# Patient Record
Sex: MALE | Race: BLACK OR AFRICAN AMERICAN | Employment: FULL TIME | URBAN - METROPOLITAN AREA
[De-identification: names, ages, dates, MRNs, and addresses within clinical notes are randomized per-mention and may not be internally consistent; named-entity substitution may affect disease eponyms.]

---

## 2017-01-06 ENCOUNTER — OFFICE VISIT (OUTPATIENT)
Dept: INTERNAL MEDICINE CLINIC | Age: 37
End: 2017-01-06

## 2017-01-06 VITALS
TEMPERATURE: 96.9 F | HEIGHT: 65 IN | BODY MASS INDEX: 21.33 KG/M2 | OXYGEN SATURATION: 99 % | RESPIRATION RATE: 16 BRPM | SYSTOLIC BLOOD PRESSURE: 114 MMHG | WEIGHT: 128 LBS | HEART RATE: 84 BPM | DIASTOLIC BLOOD PRESSURE: 86 MMHG

## 2017-01-06 DIAGNOSIS — M54.41 ACUTE MIDLINE LOW BACK PAIN WITH RIGHT-SIDED SCIATICA: Primary | ICD-10-CM

## 2017-01-06 RX ORDER — GABAPENTIN 300 MG/1
300 CAPSULE ORAL
Qty: 20 CAP | Refills: 0 | Status: SHIPPED | OUTPATIENT
Start: 2017-01-06 | End: 2017-09-20 | Stop reason: SDUPTHER

## 2017-01-06 RX ORDER — TRAMADOL HYDROCHLORIDE 50 MG/1
50 TABLET ORAL
Qty: 21 TAB | Refills: 0 | Status: SHIPPED | OUTPATIENT
Start: 2017-01-06 | End: 2017-09-20

## 2017-01-06 NOTE — LETTER
NOTIFICATION RETURN TO WORK / SCHOOL 
 
1/6/2017 10:11 AM 
 
Mr. Mariano Armendariz [address] [address] To Whom It May Concern: 
 
Mariano Armendariz is currently under the care of Analia N Shaunna Sorensen. He will return to work/school on: 1/16/17. This date may change based on his visit with specialists. If there are questions or concerns please have the patient contact our office. Sincerely, Denisse Reilly, NP

## 2017-01-06 NOTE — PATIENT INSTRUCTIONS
1. You have an appt with Dr. Mone Stephenson on Tuesday at 1:50 - arrive at 1:30 for paperwork. It is at the [facility] location  [address]  [address]  [facility]  2. Gabapentin may help with nerve pain but it can make you tired - do not take with flexeril  3. Tramadol for pain - do not take with norco  4. GO TO ED if you have dizziness, confusion, headaches return, or if you have worsening back symptoms: urine problems, worsening leg weakness, or if you can't feel his buttocks      Back Pain, Emergency or Urgent Symptoms: Care Instructions  Your Care Instructions  Many people have back pain at one time or another. In most cases, pain gets better with self-care that includes over-the-counter pain medicine, ice, heat, and exercises. Unless you have symptoms of a severe injury or heart attack, you may be able to give yourself a few days before you call a doctor. But some back problems are very serious. Do not ignore symptoms that need to be checked right away. Follow-up care is a key part of your treatment and safety. Be sure to make and go to all appointments, and call your doctor if you are having problems. It's also a good idea to know your test results and keep a list of the medicines you take. How can you care for yourself at home? · Sit or lie in positions that are most comfortable and that reduce your pain. Try one of these positions when you lie down:  ¨ Lie on your back with your knees bent and supported by large pillows. ¨ Lie on the floor with your legs on the seat of a sofa or chair. Raul Ports on your side with your knees and hips bent and a pillow between your legs. ¨ Lie on your stomach if it does not make pain worse. · Do not sit up in bed, and avoid soft couches and twisted positions. Bed rest can help relieve pain at first, but it delays healing. Avoid bed rest after the first day. · Change positions every 30 minutes.  If you must sit for long periods of time, take breaks from sitting. Get up and walk around, or lie flat. · Try using a heating pad on a low or medium setting, for 15 to 20 minutes every 2 or 3 hours. Try a warm shower in place of one session with the heating pad. You can also buy single-use heat wraps that last up to 8 hours. You can also try ice or cold packs on your back for 10 to 20 minutes at a time, several times a day. (Put a thin cloth between the ice pack and your skin.) This reduces pain and makes it easier to be active and exercise. · Take pain medicines exactly as directed. ¨ If the doctor gave you a prescription medicine for pain, take it as prescribed. ¨ If you are not taking a prescription pain medicine, ask your doctor if you can take an over-the-counter medicine. When should you call for help? Call 911 anytime you think you may need emergency care. For example, call if:  · You are unable to move a leg at all. · You have back pain with severe belly pain. · You have symptoms of a heart attack. These may include:  ¨ Chest pain or pressure, or a strange feeling in the chest.  ¨ Sweating. ¨ Shortness of breath. ¨ Nausea or vomiting. ¨ Pain, pressure, or a strange feeling in the back, neck, jaw, or upper belly or in one or both shoulders or arms. ¨ Lightheadedness or sudden weakness. ¨ A fast or irregular heartbeat. After you call 911, the  may tell you to chew 1 adult-strength or 2 to 4 low-dose aspirin. Wait for an ambulance. Do not try to drive yourself. Call your doctor now or seek immediate medical care if:  · You have new or worse symptoms in your arms, legs, chest, belly, or buttocks. Symptoms may include:  ¨ Numbness or tingling. ¨ Weakness. ¨ Pain. · You lose bladder or bowel control. · You have back pain and:  ¨ You have injured your back while lifting or doing some other activity. Call if the pain is severe, has not gone away after 1 or 2 days, and you cannot do your normal daily activities.   ¨ You have had a back injury before that needed treatment. ¨ Your pain has lasted longer than 4 weeks. ¨ You have had weight loss you cannot explain. ¨ You are age 48 or older. ¨ You have cancer now or have had it before. Watch closely for changes in your health, and be sure to contact your doctor if you are not getting better as expected. Where can you learn more? Go to http://evaristo-sylvester.info/. Enter P180 in the search box to learn more about \"Back Pain, Emergency or Urgent Symptoms: Care Instructions. \"  Current as of: May 27, 2016  Content Version: 11.1  © 6464-8507 [employer]!. Care instructions adapted under license by [employer] (which disclaims liability or warranty for this information). If you have questions about a medical condition or this instruction, always ask your healthcare professional. [address] any warranty or liability for your use of this information.

## 2017-01-06 NOTE — MR AVS SNAPSHOT
Visit Information Date & Time Provider Department Dept. Phone Encounter #  
 1/6/2017  9:45 AM Denisse Mcarthur, [address]  [id number] Follow-up Instructions Return if symptoms worsen or fail to improve. Upcoming Health Maintenance Date Due DTaP/Tdap/Td series (1 - Tdap) 3/30/2002 Pneumococcal 19-64 Medium Risk (1 of 1 - PPSV23) 2/6/2017* INFLUENZA AGE 9 TO ADULT 2/6/2017* *Topic was postponed. The date shown is not the original due date. Allergies as of 1/6/2017  Review Complete On: 1/6/2017 By: Oletha Ganser Severity Noted Reaction Type Reactions Aspirin  11/29/2012    Hives Has tolerated ibuprofen in the past  
 Aspirnil  12/04/2014    Hives Has tolerated ibuprofen in the past  
 Iodine  11/29/2012    Hives Shellfish Derived  12/04/2014    Hives Current Immunizations  Never Reviewed No immunizations on file. Not reviewed this visit You Were Diagnosed With   
  
 Codes Comments Acute midline low back pain with right-sided sciatica    -  Primary ICD-10-CM: M54.41 
ICD-9-CM: 724.2, 724.3 Vitals BP Pulse Temp Resp Height(growth percentile) Weight(growth percentile) 114/86 (BP 1 Location: Left arm, BP Patient Position: Sitting) 84 96.9 °F (36.1 °C) (Oral) 16 5' 4.5\" (1.638 m) 128 lb (58.1 kg) SpO2 BMI Smoking Status 99% 21.63 kg/m2 Current Every Day Smoker Vitals History BMI and BSA Data Body Mass Index Body Surface Area  
 21.63 kg/m 2 1.63 m 2 Preferred Pharmacy Pharmacy Name Phone [id number] [address] [phone] Your Updated Medication List  
  
   
This list is accurate as of: 1/6/17 10:08 AM.  Always use your most recent med list.  
  
  
  
  
 albuterol 90 mcg/actuation inhaler Commonly known as:  Jose William  
 Take 2 Puffs by inhalation every six (6) hours as needed. gabapentin 300 mg capsule Commonly known as:  NEURONTIN Take 1 Cap by mouth nightly as needed. methylPREDNISolone 4 mg tablet Commonly known as:  MEDROL (CARLO) Please take according to directions. traMADol 50 mg tablet Commonly known as:  ULTRAM  
Take 1 Tab by mouth every eight (8) hours as needed for Pain. Max Daily Amount: 150 mg.  
  
  
  
  
Prescriptions Printed Refills  
 traMADol (ULTRAM) 50 mg tablet 0 Sig: Take 1 Tab by mouth every eight (8) hours as needed for Pain. Max Daily Amount: 150 mg.  
 Class: Print Route: Oral  
  
Prescriptions Sent to Pharmacy Refills  
 gabapentin (NEURONTIN) 300 mg capsule 0 Sig: Take 1 Cap by mouth nightly as needed. Class: Normal  
 Pharmacy: [facility]/pharmacy [id number] [place] [address] [address]  #: [phone] Route: Oral  
  
Follow-up Instructions Return if symptoms worsen or fail to improve. Patient Instructions 1. You have an appt with Dr. Matt Garcia on Tuesday at 1:50 - arrive at 1:30 for paperwork. It is at the [address] [place] [address] location [address] [facility] [address] [place], [address] 
2. Gabapentin may help with nerve pain but it can make you tired - do not take with flexeril 3. Tramadol for pain - do not take with norco 
4. GO TO ED if you have dizziness, confusion, headaches return, or if you have worsening back symptoms: urine problems, worsening leg weakness, or if you can't feel his buttocks Back Pain, Emergency or Urgent Symptoms: Care Instructions Your Care Instructions Many people have back pain at one time or another. In most cases, pain gets better with self-care that includes over-the-counter pain medicine, ice, heat, and exercises. Unless you have symptoms of a severe injury or heart attack, you may be able to give yourself a few days before you call a doctor.  But some back problems are very serious. Do not ignore symptoms that need to be checked right away. Follow-up care is a key part of your treatment and safety. Be sure to make and go to all appointments, and call your doctor if you are having problems. It's also a good idea to know your test results and keep a list of the medicines you take. How can you care for yourself at home? · Sit or lie in positions that are most comfortable and that reduce your pain. Try one of these positions when you lie down: ¨ Lie on your back with your knees bent and supported by large pillows. ¨ Lie on the floor with your legs on the seat of a sofa or chair. Kay Sauce on your side with your knees and hips bent and a pillow between your legs. ¨ Lie on your stomach if it does not make pain worse. · Do not sit up in bed, and avoid soft couches and twisted positions. Bed rest can help relieve pain at first, but it delays healing. Avoid bed rest after the first day. · Change positions every 30 minutes. If you must sit for long periods of time, take breaks from sitting. Get up and walk around, or lie flat. · Try using a heating pad on a low or medium setting, for 15 to 20 minutes every 2 or 3 hours. Try a warm shower in place of one session with the heating pad. You can also buy single-use heat wraps that last up to 8 hours. You can also try ice or cold packs on your back for 10 to 20 minutes at a time, several times a day. (Put a thin cloth between the ice pack and your skin.) This reduces pain and makes it easier to be active and exercise. · Take pain medicines exactly as directed. ¨ If the doctor gave you a prescription medicine for pain, take it as prescribed. ¨ If you are not taking a prescription pain medicine, ask your doctor if you can take an over-the-counter medicine. When should you call for help? Call 911 anytime you think you may need emergency care. For example, call if: 
· You are unable to move a leg at all. · You have back pain with severe belly pain. · You have symptoms of a heart attack. These may include: ¨ Chest pain or pressure, or a strange feeling in the chest. 
¨ Sweating. ¨ Shortness of breath. ¨ Nausea or vomiting. ¨ Pain, pressure, or a strange feeling in the back, neck, jaw, or upper belly or in one or both shoulders or arms. ¨ Lightheadedness or sudden weakness. ¨ A fast or irregular heartbeat. After you call 911, the  may tell you to chew 1 adult-strength or 2 to 4 low-dose aspirin. Wait for an ambulance. Do not try to drive yourself. Call your doctor now or seek immediate medical care if: 
· You have new or worse symptoms in your arms, legs, chest, belly, or buttocks. Symptoms may include: ¨ Numbness or tingling. ¨ Weakness. ¨ Pain. · You lose bladder or bowel control. · You have back pain and: 
¨ You have injured your back while lifting or doing some other activity. Call if the pain is severe, has not gone away after 1 or 2 days, and you cannot do your normal daily activities. ¨ You have had a back injury before that needed treatment. ¨ Your pain has lasted longer than 4 weeks. ¨ You have had weight loss you cannot explain. ¨ You are age 48 or older. ¨ You have cancer now or have had it before. Watch closely for changes in your health, and be sure to contact your doctor if you are not getting better as expected. Where can you learn more? Go to http://evaristo-sylvester.info/. Enter Y169 in the search box to learn more about \"Back Pain, Emergency or Urgent Symptoms: Care Instructions. \" Current as of: May 27, 2016 Content Version: 11.1 © 6190-8369 Dotspin. Care instructions adapted under license by [employer] (which disclaims liability or warranty for this information).  If you have questions about a medical condition or this instruction, always ask your healthcare professional. [employer] disclaims any warranty or liability for your use of this information. Introducing [facility]! Lita Roca introduces [employer] patient portal. Now you can access parts of your medical record, email your doctor's office, and request medication refills online. 1. In your internet browser, go to [email or web address]. [email or web address] 2. Click on the First Time User? Click Here link in the Sign In box. You will see the New Member Sign Up page. 3. Enter your [employer] Access Code exactly as it appears below. You will not need to use this code after youve completed the sign-up process. If you do not sign up before the expiration date, you must request a new code. · [employer] Access Code: [email or web address] Expires: 1/29/2017  3:18 AM 
 
4. Enter the last four digits of your Social Security Number (xxxx) and Date of Birth (mm/dd/yyyy) as indicated and click Submit. You will be taken to the next sign-up page. 5. Create a [employer] ID. This will be your [employer] login ID and cannot be changed, so think of one that is secure and easy to remember. 6. Create a [employer] password. You can change your password at any time. 7. Enter your Password Reset Question and Answer. This can be used at a later time if you forget your password. 8. Enter your e-mail address. You will receive e-mail notification when new information is available in [address] 9. Click Sign Up. You can now view and download portions of your medical record. 10. Click the Download Summary menu link to download a portable copy of your medical information. If you have questions, please visit the Frequently Asked Questions section of the [employer] website. Remember, [employer] is NOT to be used for urgent needs. For medical emergencies, dial 911. Now available from your iPhone and Android! Please provide this summary of care documentation to your next provider. Your primary care clinician is listed as Phys Other. If you have any questions after today's visit, please call [phone].

## 2017-01-06 NOTE — PROGRESS NOTES
Chief Complaint   Patient presents with    Back Pain     Is having the same issues as last time but has now gotten worse    Fall     lost feeling in leg and also has a lump on head       Last flexeril was last night. Appt with ortho on the 18 th.   Has not made appt for PT yet

## 2017-01-06 NOTE — PROGRESS NOTES
Subjective: (As above and below)     Chief Complaint   Patient presents with    Back Pain     Is having the same issues as last time but has now gotten worse    Fall     lost feeling in leg and also has a lump on head    Hip Pain     right sided    Foot Pain     right feeling numbness     Genet Taylor is a 29y.o. year old male who presents for continued low back pain with right sided sciatica. He was eval'd in ED and then in this office last week. He was advised to take steroid pack as prescribed by ED and follow up with PT and ortho. He states that he completed steroids and has an appt with ortho mid January. Since last visit, he states his symptoms are worsening. He reports worsening low back pain with worsening numbness/tingling down to right foot. He says that yesterday he stood up quickly and fell due to leg weakness - he fell backwards and hit the back left side of head. After fall, he had a headache but NO loc, dizziness. He says his headache is resolved and he is still not experiencing any loc, dizziness. He denies any saddle numbness. He denies any bowel/bladder symptoms (incontinence or retention). Reviewed PmHx, RxHx, FmHx, SocHx, AllgHx and updated in chart.   Family History   Problem Relation Age of Onset    Hypertension Father     Diabetes Maternal Grandmother     Cancer Paternal Grandmother        Past Medical History   Diagnosis Date    Asthma     Low back pain       Social History     Social History    Marital status:      Spouse name: N/A    Number of children: N/A    Years of education: N/A     Social History Main Topics    Smoking status: Current Every Day Smoker     Packs/day: 0.50    Smokeless tobacco: None    Alcohol use Yes      Comment: rarely    Drug use: No    Sexual activity: Yes     Partners: Female     Other Topics Concern    None     Social History Narrative    ** Merged History Encounter **               Current Outpatient Prescriptions   Medication Sig    gabapentin (NEURONTIN) 300 mg capsule Take 1 Cap by mouth nightly as needed.  traMADol (ULTRAM) 50 mg tablet Take 1 Tab by mouth every eight (8) hours as needed for Pain. Max Daily Amount: 150 mg.    albuterol (PROVENTIL, VENTOLIN) 90 mcg/actuation inhaler Take 2 Puffs by inhalation every six (6) hours as needed.  methylPREDNISolone (MEDROL, CARLO,) 4 mg tablet Please take according to directions. No current facility-administered medications for this visit. Review of Systems:   Constitutional:    Negative for fever and chills, negative diaphoresis. HEENT:              Negative for neck pain and stiffness. Eyes:                  Negative for visual disturbance, itching, redness or discharge. Respiratory:        Negative for cough and shortness of breath. Cardiovascular:  Negative for chest pain and palpitations. Gastrointestinal: Negative for nausea, vomiting, abdominal pain, diarrhea or constipation. Genitourinary:     Negative for dysuria and frequency. Musculoskeletal: + for falls, tenderness. Skin:                    Negative for rash, masses or lesions. Neurological:       Negative for dizzyness, seizure, loss of consciousness, weakness and numbness. Objective:     Vitals:    01/06/17 0941   BP: 114/86   Pulse: 84   Resp: 16   Temp: 96.9 °F (36.1 °C)   TempSrc: Oral   SpO2: 99%   Weight: 128 lb (58.1 kg)   Height: 5' 4.5\" (1.638 m)         Physical Examination: General appearance - alert, well appearing, and in no distress  Mental status - alert, oriented to person, place, and time  Eyes - pupils equal and reactive, extraocular eye movements intact  Chest - clear to auscultation, no wheezes, rales or rhonchi, symmetric air entry  Heart - normal rate, regular rhythm, normal S1, S2, no murmurs, rubs, clicks or gallops  Neurological - alert, oriented, normal speech. Right leg strength 4/5 compared to left 5/5.  Gait is slow and favors left side d/t right sided weakness/pain  Musculoskeletal - TTP to low back - right side paraspinals. +SLR right leg. Pt is favoring leaning to right side, +levoscoliosis    Assessment/ Plan:   Follow-up Disposition:  Return if symptoms worsen or fail to improve. 1. Acute midline low back pain with right-sided sciatica  Advised stop flexeril, pt is out of [place]  Ortho appt r/s to Tuesday  Advised ED over the weekend if loc, dizziness, headaches or saddle numbness, bowel/bladder problems or worsening leg weakness  Pt is not driving, his friend brought him to appt and will provide assistance to him  - gabapentin (NEURONTIN) 300 mg capsule; Take 1 Cap by mouth nightly as needed. Dispense: 20 Cap; Refill: 0  - traMADol (ULTRAM) 50 mg tablet; Take 1 Tab by mouth every eight (8) hours as needed for Pain. Max Daily Amount: 150 mg. Dispense: 21 Tab; Refill: 0        I have discussed the diagnosis with the patient and the intended plan as seen in the above orders. The patient has received an after-visit summary and questions were answered concerning future plans. Pt conveyed understanding of plan. Medication Side Effects and Warnings were discussed with patient: yes  Patient Labs were reviewed: yes  Patient Past Records were reviewed:  yes    Felisha Mendosa.  Terry Fernando NP

## 2017-01-11 ENCOUNTER — TELEPHONE (OUTPATIENT)
Dept: INTERNAL MEDICINE CLINIC | Age: 37
End: 2017-01-11

## 2017-01-11 NOTE — TELEPHONE ENCOUNTER
Called to check on patient  He went to ortho - he returns on Thursday for follow up testing to decide surgery vs further back injections  Pt was informed that paperwork will be completed today for him to

## 2017-01-17 ENCOUNTER — TELEPHONE (OUTPATIENT)
Dept: INTERNAL MEDICINE CLINIC | Age: 37
End: 2017-01-17

## 2017-01-26 ENCOUNTER — TELEPHONE (OUTPATIENT)
Dept: INTERNAL MEDICINE CLINIC | Age: 37
End: 2017-01-26

## 2017-01-31 ENCOUNTER — DOCUMENTATION ONLY (OUTPATIENT)
Dept: INTERNAL MEDICINE CLINIC | Age: 37
End: 2017-01-31

## 2017-01-31 ENCOUNTER — TELEPHONE (OUTPATIENT)
Dept: INTERNAL MEDICINE CLINIC | Age: 37
End: 2017-01-31

## 2017-01-31 NOTE — PROGRESS NOTES
rc'd another leave form from his work. Pt has been out of work for approx 1 month. He has been followed by ortho. Unable to reach pt by phone, left msg with ortho Dr. Zack Chase staff to see what the plan is for him - he was initially cleared to be out of work until 2/1/17.

## 2017-01-31 NOTE — PROGRESS NOTES
Received call back from Dr. Aliya Smith nurse  Pt no showed ortho appt with them today  They also have leave of absence paperwork and were waiting to fill this out based on todays visit. ...   Will not fill out forms in light of this - [facility] follow up with ortho as he was supposed to

## 2017-09-20 ENCOUNTER — HOSPITAL ENCOUNTER (OUTPATIENT)
Dept: LAB | Age: 37
Discharge: HOME | End: 2017-09-20

## 2017-09-20 ENCOUNTER — OFFICE VISIT (OUTPATIENT)
Dept: INTERNAL MEDICINE CLINIC | Age: 37
End: 2017-09-20

## 2017-09-20 VITALS
TEMPERATURE: 97 F | HEART RATE: 67 BPM | BODY MASS INDEX: 21.53 KG/M2 | DIASTOLIC BLOOD PRESSURE: 88 MMHG | HEIGHT: 64 IN | OXYGEN SATURATION: 98 % | WEIGHT: 126.1 LBS | SYSTOLIC BLOOD PRESSURE: 127 MMHG | RESPIRATION RATE: 18 BRPM

## 2017-09-20 DIAGNOSIS — M54.41 CHRONIC BILATERAL LOW BACK PAIN WITH RIGHT-SIDED SCIATICA: ICD-10-CM

## 2017-09-20 DIAGNOSIS — G89.29 CHRONIC BILATERAL LOW BACK PAIN WITH RIGHT-SIDED SCIATICA: ICD-10-CM

## 2017-09-20 DIAGNOSIS — G43.009 MIGRAINE WITHOUT AURA AND WITHOUT STATUS MIGRAINOSUS, NOT INTRACTABLE: ICD-10-CM

## 2017-09-20 DIAGNOSIS — Z23 ENCOUNTER FOR IMMUNIZATION: ICD-10-CM

## 2017-09-20 DIAGNOSIS — Z13.220 SCREENING, LIPID: ICD-10-CM

## 2017-09-20 DIAGNOSIS — Z00.00 MEDICARE ANNUAL WELLNESS VISIT, SUBSEQUENT: Primary | ICD-10-CM

## 2017-09-20 PROCEDURE — 99001 SPECIMEN HANDLING PT-LAB: CPT | Performed by: NURSE PRACTITIONER

## 2017-09-20 RX ORDER — GABAPENTIN 300 MG/1
300 CAPSULE ORAL
Qty: 30 CAP | Refills: 1 | Status: SHIPPED | OUTPATIENT
Start: 2017-09-20 | End: 2017-12-29

## 2017-09-20 RX ORDER — IBUPROFEN 800 MG/1
800 TABLET ORAL
Qty: 60 TAB | Refills: 1 | Status: SHIPPED | OUTPATIENT
Start: 2017-09-20 | End: 2017-12-29

## 2017-09-20 RX ORDER — SUMATRIPTAN 100 MG/1
100 TABLET, FILM COATED ORAL
Qty: 20 TAB | Refills: 0 | Status: SHIPPED | OUTPATIENT
Start: 2017-09-20 | End: 2018-10-11 | Stop reason: SDUPTHER

## 2017-09-20 NOTE — PATIENT INSTRUCTIONS
Ashley Ibarra:  [address], [address]  [address] Physician Office  [address] Physical Therapy  Phone: [phone]      1. Try imitrex for headaches at onset of headache take 1 tab. May repeat in 2 hours if needed but no more than 2 pills in 1 day  2. If headaches worsen: \"worse headache of life\", confusion, dizziness = go to ED  3. Return if headaches not improving   4. Return to ortho for your back       Migraine Headache: Care Instructions  Your Care Instructions  Migraines are painful, throbbing headaches that often start on one side of the head. They may cause nausea and vomiting and make you sensitive to light, sound, or smell. Without treatment, migraines can last from 4 hours to a few days. Medicines can help prevent migraines or stop them after they have started. Your doctor can help you find which ones work best for you. Follow-up care is a key part of your treatment and safety. Be sure to make and go to all appointments, and call your doctor if you are having problems. It's also a good idea to know your test results and keep a list of the medicines you take. How can you care for yourself at home? · Do not drive if you have taken a prescription pain medicine. · Rest in a quiet, dark room until your headache is gone. Close your eyes, and try to relax or go to sleep. Don't watch TV or read. · Put a cold, moist cloth or cold pack on the painful area for 10 to 20 minutes at a time. Put a thin cloth between the cold pack and your skin. · Use a warm, moist towel or a heating pad set on low to relax tight shoulder and neck muscles. · Have someone gently massage your neck and shoulders. · Take your medicines exactly as prescribed. Call your doctor if you think you are having a problem with your medicine. You will get more details on the specific medicines your doctor prescribes.   · Be careful not to take pain medicine more often than the instructions allow. You could get worse or more frequent headaches when the medicine wears off. To prevent migraines  · Keep a headache diary so you can figure out what triggers your headaches. Avoiding triggers may help you prevent headaches. Record when each headache began, how long it lasted, and what the pain was like. (Was it throbbing, aching, stabbing, or dull?) Write down any other symptoms you had with the headache, such as nausea, flashing lights or dark spots, or sensitivity to bright light or loud noise. Note if the headache occurred near your period. List anything that might have triggered the headache. Triggers may include certain foods (chocolate, cheese, wine) or odors, smoke, bright light, stress, or lack of sleep. · If your doctor has prescribed medicine for your migraines, take it as directed. You may have medicine that you take only when you get a migraine and medicine that you take all the time to help prevent migraines. ¨ If your doctor has prescribed medicine for when you get a headache, take it at the first sign of a migraine, unless your doctor has given you other instructions. ¨ If your doctor has prescribed medicine to prevent migraines, take it exactly as prescribed. Call your doctor if you think you are having a problem with your medicine. · Find healthy ways to deal with stress. Migraines are most common during or right after stressful times. Take time to relax before and after you do something that has caused a migraine in the past.  · Try to keep your muscles relaxed by keeping good posture. Check your jaw, face, neck, and shoulder muscles for tension. Try to relax them. When you sit at a desk, change positions often. And make sure to stretch for 30 seconds each hour. · Get plenty of sleep and exercise. · Eat meals on a regular schedule. Avoid foods and drinks that often trigger migraines.  These include chocolate, alcohol (especially red wine and port), aspartame, monosodium glutamate (MSG), and some additives found in foods (such as hot dogs, álvarez, cold cuts, aged cheeses, and pickled foods). · Limit caffeine. Don't drink too much coffee, tea, or soda. But don't quit caffeine suddenly. That can also give you migraines. · Do not smoke or allow others to smoke around you. If you need help quitting, talk to your doctor about stop-smoking programs and medicines. These can increase your chances of quitting for good. · If you are taking birth control pills or hormone therapy, talk to your doctor about whether they are triggering your migraines. When should you call for help? Call 911 anytime you think you may need emergency care. For example, call if:  · You have signs of a stroke. These may include:  ¨ Sudden numbness, paralysis, or weakness in your face, arm, or leg, especially on only one side of your body. ¨ Sudden vision changes. ¨ Sudden trouble speaking. ¨ Sudden confusion or trouble understanding simple statements. ¨ Sudden problems with walking or balance. ¨ A sudden, severe headache that is different from past headaches. Call your doctor now or seek immediate medical care if:  · You have new or worse nausea and vomiting. · You have a new or higher fever. · Your headache gets much worse. Watch closely for changes in your health, and be sure to contact your doctor if:  · You are not getting better after 2 days (48 hours). Where can you learn more? Go to http://evaristo-sylvester.info/. Enter M180 in the search box to learn more about \"Migraine Headache: Care Instructions. \"  Current as of: October 14, 2016  Content Version: 11.3  © 6288-1338 [employer]. Care instructions adapted under license by [employer] (which disclaims liability or warranty for this information).  If you have questions about a medical condition or this instruction, always ask your healthcare professional. [employer] disclaims any warranty or liability for your use of this information. Influenza (Flu) Vaccine: Care Instructions  Your Care Instructions  Influenza (flu) is an infection in the lungs and breathing passages. It is caused by the influenza virus. There are different strains, or types, of the flu virus every year. The flu comes on quickly. It can cause a cough, stuffy nose, fever, chills, tiredness, and aches and pains. These symptoms may last up to 10 days. The flu can make you feel very sick, but most of the time it doesn't lead to other problems. But it can cause serious problems in people who are older or who have a long-term illness, such as heart disease or diabetes. You can help prevent the flu by getting a flu vaccine every year, as soon as it is available. You cannot get the flu from the vaccine. The vaccine prevents most cases of the flu. But even when the vaccine doesn't prevent the flu, it can make symptoms less severe and reduce the chance of problems from the flu. Sometimes, young children and people who have an immune system problem may have a slight fever or muscle aches or pains 6 to 12 hours after getting the shot. These symptoms usually last 1 or 2 days. Follow-up care is a key part of your treatment and safety. Be sure to make and go to all appointments, and call your doctor if you are having problems. It's also a good idea to know your test results and keep a list of the medicines you take. Who should get the flu vaccine? Everyone age 7 months or older should get a flu vaccine each year. It lowers the chance of getting and spreading the flu. The vaccine is very important for people who are at high risk for getting other health problems from the flu. This includes:  · Anyone 48years of age or older. · People who live in a long-term care center, such as a nursing home. · All children 6 months through 25years of age.   · Adults and children 6 months and older who have long-term heart or lung problems, such as asthma. · Adults and children 6 months and older who needed medical care or were in a hospital during the past year because of diabetes, chronic kidney disease, or a weak immune system (including HIV or AIDS). · Women who will be pregnant during the flu season. · People who have any condition that can make it hard to breathe or swallow (such as a brain injury or muscle disorders). · People who can give the flu to others who are at high risk for problems from the flu. This includes all health care workers and close contacts of people age 72 or older. Who should not get the flu vaccine? The person who gives the vaccine may tell you not to get it if you:  · Have a severe allergy to eggs or any part of the vaccine. · Have had a severe reaction to a flu vaccine in the past.  · Have had Guillain-Barré syndrome (GBS). · Are sick with a fever. (Get the vaccine when symptoms are gone.)  How can you care for yourself at home? · If you or your child has a sore arm or a slight fever after the shot, take an over-the-counter pain medicine, such as acetaminophen (Tylenol) or ibuprofen (Advil, Motrin). Read and follow all instructions on the label. Do not give aspirin to anyone younger than 20. It has been linked to Reye syndrome, a serious illness. · Do not take two or more pain medicines at the same time unless the doctor told you to. Many pain medicines have acetaminophen, which is Tylenol. Too much acetaminophen (Tylenol) can be harmful. When should you call for help? Call 911 anytime you think you may need emergency care. For example, call if after getting the flu vaccine:  · You have symptoms of a severe reaction to the flu vaccine. Symptoms of a severe reaction may include:  ¨ Severe difficulty breathing. ¨ Sudden raised, red areas (hives) all over your body. ¨ Severe lightheadedness.   Call your doctor now or seek immediate medical care if after getting the flu vaccine:  · You think you are having a reaction to the flu vaccine, such as a new fever. Watch closely for changes in your health, and be sure to contact your doctor if you have any problems. Where can you learn more? Go to http://evaristo-sylvester.info/. Enter R419 in the search box to learn more about \"Influenza (Flu) Vaccine: Care Instructions. \"  Current as of: August 1, 2016  Content Version: 11.3  © 6078-7149 [employer]. Care instructions adapted under license by [employer] (which disclaims liability or warranty for this information). If you have questions about a medical condition or this instruction, always ask your healthcare professional. [address] any warranty or liability for your use of this information.

## 2017-09-20 NOTE — PROGRESS NOTES
This is a Subsequent Medicare Annual Wellness Exam (AWV) (Performed 12 months after IPPE or effective date of Medicare Part B enrollment, Once in a lifetime)    I have reviewed the patient's medical history in detail and updated the computerized patient record. History     He is here for AWV but also following concerns:    Headaches:  right side sharp pain. \"for a while\" 2-3 time weekly. Each lasting a few hours. Not associated with n/v/loc. No known triggers. Feels like vision gets \"a little funny\". Takes tylenol, motrin or aleve which does not help. No auras. +photophobia. No phonophobia. Hx of rare ha in the past but getting more frequent. He states he is supposed to wear eye glasses but does not. No ha today    Back pain: since last visit he has seen ortho and has rc'd PT and injections. He states that he has gotten approx 4 shots and they last approx 2 months. He states that he does not feel that they are working well and is asking what the next step is, he asks if he is a surgical candidate or if he can get a second opinion from different ortho. He does not have f/u planned with any ortho at this time. He states his back pain flares up a few times per month, he asks for form for intermittent leave from work. No new back symptoms. Past Medical History:   Diagnosis Date    Asthma     Low back pain     Scoliosis 1989      Past Surgical History:   Procedure Laterality Date    HX OTHER SURGICAL      sx to enlarge esophagus as infant    HX OTHER SURGICAL      hernia repair as infant     Current Outpatient Prescriptions   Medication Sig Dispense Refill    gabapentin (NEURONTIN) 300 mg capsule Take 1 Cap by mouth nightly as needed. 30 Cap 1    SUMAtriptan (IMITREX) 100 mg tablet Take 1 Tab by mouth once as needed for Migraine for up to 1 dose. 20 Tab 0    ibuprofen (MOTRIN) 800 mg tablet Take 1 Tab by mouth every eight (8) hours as needed for Pain.  60 Tab 1    albuterol (PROVENTIL, VENTOLIN) 90 mcg/actuation inhaler Take 2 Puffs by inhalation every six (6) hours as needed. Allergies   Allergen Reactions    Aspirin Hives     Has tolerated ibuprofen in the past    Aspirnil Hives     Has tolerated ibuprofen in the past    Iodine Hives    Shellfish Derived Hives     Family History   Problem Relation Age of Onset    Hypertension Father     Diabetes Maternal Grandmother     Cancer Paternal Grandmother      Social History   Substance Use Topics    Smoking status: Current Every Day Smoker     Packs/day: 0.50    Smokeless tobacco: Never Used      Comment: 10 daily    Alcohol use Yes      Comment: rarely     Patient Active Problem List   Diagnosis Code    Chronic right-sided low back pain with sciatica M54.40, G89.29       Depression Risk Factor Screening:     PHQ over the last two weeks 9/20/2017   Little interest or pleasure in doing things Several days   Feeling down, depressed or hopeless Several days   Total Score PHQ 2 2     Alcohol Risk Factor Screening: You do not drink alcohol or very rarely. Functional Ability and Level of Safety:   Hearing Loss  Hearing is good. Activities of Daily Living  The home contains: no safety equipment  Patient does total self care    Fall RiskNo flowsheet data found. Abuse Screen  Patient is not abused    Cognitive Screening   Evaluation of Cognitive Function:  Has your family/caregiver stated any concerns about your memory: no  Normal    Patient Care Team   Patient Care Team:  Job Carroll MD as PCP - General  Phys Other, MD    Assessment/Plan   Education and counseling provided:  Are appropriate based on today's review and evaluation    Diagnoses and all orders for this visit:    1. Medicare annual wellness visit, subsequent  -     METABOLIC PANEL, COMPREHENSIVE  -     LIPID PANEL    2. Chronic bilateral low back pain with right-sided sciatica  -     gabapentin (NEURONTIN) 300 mg capsule; Take 1 Cap by mouth nightly as needed.     3. Frequent headaches    4. Encounter for immunization  -     INFLUENZA VIRUS VACCINE QUADRIVALENT, PRESERVATIVE FREE SYRINGE (66926)    5. Screening, lipid    Other orders  -     SUMAtriptan (IMITREX) 100 mg tablet; Take 1 Tab by mouth once as needed for Migraine for up to 1 dose. -     ibuprofen (MOTRIN) 800 mg tablet; Take 1 Tab by mouth every eight (8) hours as needed for Pain. Health Maintenance Due   Topic Date Due    MEDICARE YEARLY EXAM  03/30/1999    Pneumococcal 19-64 Medium Risk (1 of 1 - PPSV23) 03/30/2000    DTaP/Tdap/Td series (1 - Tdap) 03/30/2002    INFLUENZA AGE 9 TO ADULT  08/01/2017                   Subjective: (As above and below)     Chief Complaint   Patient presents with    Back Pain     Hx Of Scoliosis    Headache    Form Completion     Joslyn Plasencia is a 40y.o. year old male who presents for     Saw ortho at last visit and had injection - last in February. States he had 4 shot since last visit. Reviewed PmHx, RxHx, FmHx, SocHx, AllgHx and updated in chart. Family History   Problem Relation Age of Onset    Hypertension Father     Diabetes Maternal Grandmother     Cancer Paternal Grandmother        Past Medical History:   Diagnosis Date    Asthma     Low back pain     Scoliosis 1989      Social History     Social History    Marital status:      Spouse name: N/A    Number of children: N/A    Years of education: N/A     Social History Main Topics    Smoking status: Current Every Day Smoker     Packs/day: 0.50    Smokeless tobacco: Never Used      Comment: 10 daily    Alcohol use Yes      Comment: rarely    Drug use: No    Sexual activity: Yes     Partners: Female     Other Topics Concern    None     Social History Narrative    ** Merged History Encounter **               Current Outpatient Prescriptions   Medication Sig    gabapentin (NEURONTIN) 300 mg capsule Take 1 Cap by mouth nightly as needed.     SUMAtriptan (IMITREX) 100 mg tablet Take 1 Tab by mouth once as needed for Migraine for up to 1 dose.  ibuprofen (MOTRIN) 800 mg tablet Take 1 Tab by mouth every eight (8) hours as needed for Pain.  albuterol (PROVENTIL, VENTOLIN) 90 mcg/actuation inhaler Take 2 Puffs by inhalation every six (6) hours as needed. No current facility-administered medications for this visit. Review of Systems:   Constitutional:    Negative for fever and chills, negative diaphoresis. HEENT:              Negative for neck pain and stiffness. Eyes:                  Negative for visual disturbance, itching, redness or discharge. Respiratory:        Negative for cough and shortness of breath. Cardiovascular:  Negative for chest pain and palpitations. Gastrointestinal: Negative for nausea, vomiting, abdominal pain, diarrhea or constipation. Genitourinary:     Negative for dysuria and frequency. Musculoskeletal: Negative for falls, tenderness and swelling. Skin:                    Negative for rash, masses or lesions. Neurological:       Negative for dizzyness, seizure, loss of consciousness, weakness and numbness.      Objective:     Vitals:    09/20/17 0808   BP: 127/88   Pulse: 67   Resp: 18   Temp: 97 °F (36.1 °C)   TempSrc: Oral   SpO2: 98%   Weight: 126 lb 1.6 oz (57.2 kg)   Height: 5' 4\" (1.626 m)       Results for orders placed or performed during the hospital encounter of 10/31/16   URINALYSIS W/ REFLEX CULTURE   Result Value Ref Range    Color YELLOW/STRAW      Appearance CLEAR CLEAR      Specific gravity 1.025 1.003 - 1.030      pH (UA) 8.0 5.0 - 8.0      Protein TRACE (A) NEG mg/dL    Glucose NEGATIVE  NEG mg/dL    Ketone NEGATIVE  NEG mg/dL    Bilirubin NEGATIVE  NEG      Blood NEGATIVE  NEG      Urobilinogen 1.0 0.2 - 1.0 EU/dL    Nitrites NEGATIVE  NEG      Leukocyte Esterase NEGATIVE  NEG      WBC 0-4 0 - 4 /hpf    RBC 0-5 0 - 5 /hpf    Epithelial cells FEW FEW /lpf    Bacteria NEGATIVE  NEG /hpf    UA:UC IF INDICATED CULTURE NOT INDICATED BY UA RESULT CNI      Hyaline cast 0-2 0 - 5 /lpf         Physical Examination: General appearance - alert, well appearing, and in no distress  Mental status - alert, oriented to person, place, and time  Eyes - pupils equal and reactive, extraocular eye movements intact  Ears - bilateral TM's and external ear canals normal  Chest - clear to auscultation, no wheezes, rales or rhonchi, symmetric air entry  Heart - normal rate, regular rhythm, normal S1, S2, no murmurs, rubs, clicks or gallops  Extremities - No lower extremity edema       Assessment/ Plan:   Follow-up Disposition:  Return in about 1 year (around 9/20/2018), or if symptoms worsen or fail to improve. Reviewed red flag ha symptoms  Advised f/u with ortho to see what next step is for back pain      1. Medicare annual wellness visit, subsequent    - METABOLIC PANEL, COMPREHENSIVE  - LIPID PANEL    2. Chronic bilateral low back pain with right-sided sciatica    - gabapentin (NEURONTIN) 300 mg capsule; Take 1 Cap by mouth nightly as needed. Dispense: 30 Cap; Refill: 1  - ibuprofen (MOTRIN) 800 mg tablet; Take 1 Tab by mouth every eight (8) hours as needed for Pain. Dispense: 60 Tab; Refill: 1    3. Encounter for immunization    - INFLUENZA VIRUS VACCINE QUADRIVALENT, PRESERVATIVE FREE SYRINGE ([id number])    4. Screening, lipid    - LIPID PANEL    5. Migraine without aura and without status migrainosus, not intractable  Reviewed dose instructions  - SUMAtriptan (IMITREX) 100 mg tablet; Take 1 Tab by mouth once as needed for Migraine for up to 1 dose. Dispense: 20 Tab; Refill: 0        I have discussed the diagnosis with the patient and the intended plan as seen in the above orders. The patient has received an after-visit summary and questions were answered concerning future plans. Pt conveyed understanding of plan.       Medication Side Effects and Warnings were discussed with patient: yes  Patient Labs were reviewed: yes  Patient Past Records were reviewed:  yes    Abhijeet Jasso.  Mike Ayala, YAJAIRA

## 2017-09-20 NOTE — PROGRESS NOTES
Pt here for   Chief Complaint   Patient presents with    Back Pain     Hx Of Scoliosis    Headache    Form Completion     1. Have you been to the ER, urgent care clinic since your last visit? Hospitalized since your last visit? No    2. Have you seen or consulted any other health care providers outside of the [address] [place] [address] since your last visit? Include any pap smears or colon screening.  No     Pt c/o pain 4 of 10, pt took tylenol today    PHQ over the last two weeks 9/20/2017   Little interest or pleasure in doing things Several days   Feeling down, depressed or hopeless Several days   Total Score PHQ 2 2

## 2017-09-20 NOTE — MR AVS SNAPSHOT
Visit Information Date & Time Provider Department Dept. Phone Encounter #  
 9/20/2017  7:45 AM Denisse Baez, [address] [id number] Follow-up Instructions Return in about 1 year (around 9/20/2018), or if symptoms worsen or fail to improve. Upcoming Health Maintenance Date Due  
 MEDICARE YEARLY EXAM 3/30/1999 Pneumococcal 19-64 Medium Risk (1 of 1 - PPSV23) 3/30/2000 DTaP/Tdap/Td series (1 - Tdap) 3/30/2002 INFLUENZA AGE 9 TO ADULT 8/1/2017 Allergies as of 9/20/2017  Review Complete On: 9/20/2017 By: Meaghan Bull. Rossi Baez, YAJAIRA Severity Noted Reaction Type Reactions Aspirin  11/29/2012    Hives Has tolerated ibuprofen in the past  
 Aspirnil  12/04/2014    Hives Has tolerated ibuprofen in the past  
 Iodine  11/29/2012    Hives Shellfish Derived  12/04/2014    Hives Current Immunizations  Never Reviewed No immunizations on file. Not reviewed this visit You Were Diagnosed With   
  
 Codes Comments Medicare annual wellness visit, subsequent    -  Primary ICD-10-CM: Z00.00 ICD-9-CM: V70.0 Chronic bilateral low back pain with right-sided sciatica     ICD-10-CM: M54.41, G89.29 ICD-9-CM: 724.2, 724.3, 338.29 Frequent headaches     ICD-10-CM: R51 ICD-9-CM: 784.0 Encounter for immunization     ICD-10-CM: M60 ICD-9-CM: V03.89 Screening, lipid     ICD-10-CM: X57.010 ICD-9-CM: V77.91 Vitals BP Pulse Temp Resp Height(growth percentile) Weight(growth percentile) 127/88 (BP 1 Location: Left arm, BP Patient Position: Sitting) 67 97 °F (36.1 °C) (Oral) 18 5' 4\" (1.626 m) 126 lb 1.6 oz (57.2 kg) SpO2 BMI Smoking Status 98% 21.65 kg/m2 Current Every Day Smoker BMI and BSA Data Body Mass Index Body Surface Area  
 21.65 kg/m 2 1.61 m 2 Preferred Pharmacy Pharmacy Name Phone [facility]/PHARMACY [address] [place] [address] - [address] [phone] Your Updated Medication List  
  
   
This list is accurate as of: 9/20/17  8:33 AM.  Always use your most recent med list.  
  
  
  
  
 albuterol 90 mcg/actuation inhaler Commonly known as:  Patricia Leaven Take 2 Puffs by inhalation every six (6) hours as needed. gabapentin 300 mg capsule Commonly known as:  NEURONTIN Take 1 Cap by mouth nightly as needed. ibuprofen 800 mg tablet Commonly known as:  MOTRIN Take 1 Tab by mouth every eight (8) hours as needed for Pain. SUMAtriptan 100 mg tablet Commonly known as:  IMITREX Take 1 Tab by mouth once as needed for Migraine for up to 1 dose. Prescriptions Sent to Pharmacy Refills  
 gabapentin (NEURONTIN) 300 mg capsule 1 Sig: Take 1 Cap by mouth nightly as needed. Class: Normal  
 Pharmacy: [facility]/pharmacy [address] [place] [address] [address] Ph #: [phone] Route: Oral  
 SUMAtriptan (IMITREX) 100 mg tablet 0 Sig: Take 1 Tab by mouth once as needed for Migraine for up to 1 dose. Class: Normal  
 [facility] [address] [place] [address] [address] Ph #: [phone] Route: Oral  
 ibuprofen (MOTRIN) 800 mg tablet 1 Sig: Take 1 Tab by mouth every eight (8) hours as needed for Pain. Class: Normal  
 Pharmacy: [facility]/pharmacy [address] [place] [address] [address] Ph #: [phone] Route: Oral  
  
We Performed the Following LIPID PANEL [01496 CPT(R)] METABOLIC PANEL, COMPREHENSIVE [49124 CPT(R)] Follow-up Instructions Return in about 1 year (around 9/20/2018), or if symptoms worsen or fail to improve. Patient Instructions [facility]: 
[address] [place] [address], [address] Physician Office [address] Physical Therapy Phone: [phone] 1. Try imitrex for headaches at onset of headache take 1 tab. May repeat in 2 hours if needed but no more than 2 pills in 1 day 2. If headaches worsen: \"worse headache of life\", confusion, dizziness = go to ED 3. Return if headaches not improving 4. Return to ortho for your back Migraine Headache: Care Instructions Your Care Instructions Migraines are painful, throbbing headaches that often start on one side of the head. They may cause nausea and vomiting and make you sensitive to light, sound, or smell. Without treatment, migraines can last from 4 hours to a few days. Medicines can help prevent migraines or stop them after they have started. Your doctor can help you find which ones work best for you. Follow-up care is a key part of your treatment and safety. Be sure to make and go to all appointments, and call your doctor if you are having problems. It's also a good idea to know your test results and keep a list of the medicines you take. How can you care for yourself at home? · Do not drive if you have taken a prescription pain medicine. · Rest in a quiet, dark room until your headache is gone. Close your eyes, and try to relax or go to sleep. Don't watch TV or read. · Put a cold, moist cloth or cold pack on the painful area for 10 to 20 minutes at a time. Put a thin cloth between the cold pack and your skin. · Use a warm, moist towel or a heating pad set on low to relax tight shoulder and neck muscles. · Have someone gently massage your neck and shoulders. · Take your medicines exactly as prescribed. Call your doctor if you think you are having a problem with your medicine. You will get more details on the specific medicines your doctor prescribes. · Be careful not to take pain medicine more often than the instructions allow. You could get worse or more frequent headaches when the medicine wears off. To prevent migraines · Keep a headache diary so you can figure out what triggers your headaches. Avoiding triggers may help you prevent headaches. Record when each headache began, how long it lasted, and what the pain was like. (Was it throbbing, aching, stabbing, or dull?) Write down any other symptoms you had with the headache, such as nausea, flashing lights or dark spots, or sensitivity to bright light or loud noise. Note if the headache occurred near your period. List anything that might have triggered the headache. Triggers may include certain foods (chocolate, cheese, wine) or odors, smoke, bright light, stress, or lack of sleep. · If your doctor has prescribed medicine for your migraines, take it as directed. You may have medicine that you take only when you get a migraine and medicine that you take all the time to help prevent migraines. ¨ If your doctor has prescribed medicine for when you get a headache, take it at the first sign of a migraine, unless your doctor has given you other instructions. ¨ If your doctor has prescribed medicine to prevent migraines, take it exactly as prescribed. Call your doctor if you think you are having a problem with your medicine. · Find healthy ways to deal with stress. Migraines are most common during or right after stressful times. Take time to relax before and after you do something that has caused a migraine in the past. 
· Try to keep your muscles relaxed by keeping good posture. Check your jaw, face, neck, and shoulder muscles for tension. Try to relax them. When you sit at a desk, change positions often. And make sure to stretch for 30 seconds each hour. · Get plenty of sleep and exercise. · Eat meals on a regular schedule. Avoid foods and drinks that often trigger migraines.  These include chocolate, alcohol (especially red wine and port), aspartame, monosodium glutamate (MSG), and some additives found in foods (such as hot dogs, álvarez, cold cuts, aged cheeses, and pickled foods). · Limit caffeine. Don't drink too much coffee, tea, or soda. But don't quit caffeine suddenly. That can also give you migraines. · Do not smoke or allow others to smoke around you. If you need help quitting, talk to your doctor about stop-smoking programs and medicines. These can increase your chances of quitting for good. · If you are taking birth control pills or hormone therapy, talk to your doctor about whether they are triggering your migraines. When should you call for help? Call 911 anytime you think you may need emergency care. For example, call if: 
· You have signs of a stroke. These may include: 
¨ Sudden numbness, paralysis, or weakness in your face, arm, or leg, especially on only one side of your body. ¨ Sudden vision changes. ¨ Sudden trouble speaking. ¨ Sudden confusion or trouble understanding simple statements. ¨ Sudden problems with walking or balance. ¨ A sudden, severe headache that is different from past headaches. Call your doctor now or seek immediate medical care if: 
· You have new or worse nausea and vomiting. · You have a new or higher fever. · Your headache gets much worse. Watch closely for changes in your health, and be sure to contact your doctor if: 
· You are not getting better after 2 days (48 hours). Where can you learn more? Go to http://evaristo-sylvester.info/. Enter C632 in the search box to learn more about \"Migraine Headache: Care Instructions. \" Current as of: October 14, 2016 Content Version: 11.3 © 2570-3877 [employer]. Care instructions adapted under license by [employer] (which disclaims liability or warranty for this information). If you have questions about a medical condition or this instruction, always ask your healthcare professional. [address] any warranty or liability for your use of this information. Influenza (Flu) Vaccine: Care Instructions Your Care Instructions Influenza (flu) is an infection in the lungs and breathing passages. It is caused by the influenza virus. There are different strains, or types, of the flu virus every year. The flu comes on quickly. It can cause a cough, stuffy nose, fever, chills, tiredness, and aches and pains. These symptoms may last up to 10 days. The flu can make you feel very sick, but most of the time it doesn't lead to other problems. But it can cause serious problems in people who are older or who have a long-term illness, such as heart disease or diabetes. You can help prevent the flu by getting a flu vaccine every year, as soon as it is available. You cannot get the flu from the vaccine. The vaccine prevents most cases of the flu. But even when the vaccine doesn't prevent the flu, it can make symptoms less severe and reduce the chance of problems from the flu. Sometimes, young children and people who have an immune system problem may have a slight fever or muscle aches or pains 6 to 12 hours after getting the shot. These symptoms usually last 1 or 2 days. Follow-up care is a key part of your treatment and safety. Be sure to make and go to all appointments, and call your doctor if you are having problems. It's also a good idea to know your test results and keep a list of the medicines you take. Who should get the flu vaccine? Everyone age 7 months or older should get a flu vaccine each year. It lowers the chance of getting and spreading the flu. The vaccine is very important for people who are at high risk for getting other health problems from the flu. This includes: · Anyone 48years of age or older. · People who live in a long-term care center, such as a nursing home. · All children 6 months through 25years of age. · Adults and children 6 months and older who have long-term heart or lung problems, such as asthma. · Adults and children 6 months and older who needed medical care or were in a hospital during the past year because of diabetes, chronic kidney disease, or a weak immune system (including HIV or AIDS). · Women who will be pregnant during the flu season. · People who have any condition that can make it hard to breathe or swallow (such as a brain injury or muscle disorders). · People who can give the flu to others who are at high risk for problems from the flu. This includes all health care workers and close contacts of people age 72 or older. Who should not get the flu vaccine? The person who gives the vaccine may tell you not to get it if you: 
· Have a severe allergy to eggs or any part of the vaccine. · Have had a severe reaction to a flu vaccine in the past. 
· Have had Guillain-Barré syndrome (GBS). · Are sick with a fever. (Get the vaccine when symptoms are gone.) How can you care for yourself at home? · If you or your child has a sore arm or a slight fever after the shot, take an over-the-counter pain medicine, such as acetaminophen (Tylenol) or ibuprofen (Advil, Motrin). Read and follow all instructions on the label. Do not give aspirin to anyone younger than 20. It has been linked to Reye syndrome, a serious illness. · Do not take two or more pain medicines at the same time unless the doctor told you to. Many pain medicines have acetaminophen, which is Tylenol. Too much acetaminophen (Tylenol) can be harmful. When should you call for help? Call 911 anytime you think you may need emergency care. For example, call if after getting the flu vaccine: 
· You have symptoms of a severe reaction to the flu vaccine. Symptoms of a severe reaction may include: ¨ Severe difficulty breathing. ¨ Sudden raised, red areas (hives) all over your body. ¨ Severe lightheadedness. Call your doctor now or seek immediate medical care if after getting the flu vaccine: · You think you are having a reaction to the flu vaccine, such as a new fever. Watch closely for changes in your health, and be sure to contact your doctor if you have any problems. Where can you learn more? Go to http://evaristo-sylvester.info/. Enter W473 in the search box to learn more about \"Influenza (Flu) Vaccine: Care Instructions. \" Current as of: August 1, 2016 Content Version: 11.3 © 0157-3880 [employer]. Care instructions adapted under license by [employer] (which disclaims liability or warranty for this information). If you have questions about a medical condition or this instruction, always ask your healthcare professional. [address] any warranty or liability for your use of this information. Introducing [address]! Mark Miller introduces Onefeat patient portal. Now you can access parts of your medical record, email your doctor's office, and request medication refills online. 1. In your internet browser, go to [email or web address] 2. Click on the First Time User? Click Here link in the Sign In box. You will see the New Member Sign Up page. 3. Enter your Onefeat Access Code exactly as it appears below. You will not need to use this code after youve completed the sign-up process. If you do not sign up before the expiration date, you must request a new code. · Onefeat Access Code: [email or web address] Expires: 12/19/2017  8:33 AM 
 
4. Enter the last four digits of your Social Security Number (xxxx) and Date of Birth (mm/dd/yyyy) as indicated and click Submit. You will be taken to the next sign-up page. 5. Create a Healthcare Interactiv[employer] ID. This will be your Onefeat login ID and cannot be changed, so think of one that is secure and easy to remember. 6. Create a Onefeat password. You can change your password at any time. 7. Enter your Password Reset Question and Answer.  This can be used at a later time if you forget your password. 8. Enter your e-mail address. You will receive e-mail notification when new information is available in [address] 9. Click Sign Up. You can now view and download portions of your medical record. 10. Click the Download Summary menu link to download a portable copy of your medical information. If you have questions, please visit the Frequently Asked Questions section of the [employer] website. Remember, [employer] is NOT to be used for urgent needs. For medical emergencies, dial 911. Now available from your iPhone and Android! Please provide this summary of care documentation to your next provider. Your primary care clinician is listed as Phys Other. If you have any questions after today's visit, please call [phone].

## 2017-09-21 PROBLEM — E78.5 HYPERLIPIDEMIA: Status: ACTIVE | Noted: 2017-09-21

## 2017-09-21 LAB
ALBUMIN SERPL-MCNC: 4.7 G/DL (ref 3.5–5.5)
ALBUMIN/GLOB SERPL: 1.7 {RATIO} (ref 1.2–2.2)
ALP SERPL-CCNC: 63 IU/L (ref 39–117)
ALT SERPL-CCNC: 11 IU/L (ref 0–44)
AST SERPL-CCNC: 13 IU/L (ref 0–40)
BILIRUB SERPL-MCNC: 0.3 MG/DL (ref 0–1.2)
BUN SERPL-MCNC: 11 MG/DL (ref 6–20)
BUN/CREAT SERPL: 9 (ref 9–20)
CALCIUM SERPL-MCNC: 9.5 MG/DL (ref 8.7–10.2)
CHLORIDE SERPL-SCNC: 101 MMOL/L (ref 96–106)
CHOLEST SERPL-MCNC: 219 MG/DL (ref 100–199)
CO2 SERPL-SCNC: 25 MMOL/L (ref 18–29)
CREAT SERPL-MCNC: 1.16 MG/DL (ref 0.76–1.27)
GLOBULIN SER CALC-MCNC: 2.8 G/DL (ref 1.5–4.5)
GLUCOSE SERPL-MCNC: 92 MG/DL (ref 65–99)
HDLC SERPL-MCNC: 82 MG/DL
INTERPRETATION, 910389: NORMAL
LDLC SERPL CALC-MCNC: 114 MG/DL (ref 0–99)
POTASSIUM SERPL-SCNC: 4.6 MMOL/L (ref 3.5–5.2)
PROT SERPL-MCNC: 7.5 G/DL (ref 6–8.5)
SODIUM SERPL-SCNC: 139 MMOL/L (ref 134–144)
TRIGL SERPL-MCNC: 114 MG/DL (ref 0–149)
VLDLC SERPL CALC-MCNC: 23 MG/DL (ref 5–40)

## 2017-11-07 DIAGNOSIS — J45.909 ASTHMA, UNSPECIFIED ASTHMA SEVERITY, UNSPECIFIED WHETHER COMPLICATED, UNSPECIFIED WHETHER PERSISTENT: Primary | ICD-10-CM

## 2017-11-07 RX ORDER — ALBUTEROL SULFATE 90 UG/1
1 AEROSOL, METERED RESPIRATORY (INHALATION)
Qty: 1 INHALER | Refills: 1 | Status: SHIPPED | OUTPATIENT
Start: 2017-11-07 | End: 2018-03-15 | Stop reason: SDUPTHER

## 2017-12-29 ENCOUNTER — OFFICE VISIT (OUTPATIENT)
Dept: INTERNAL MEDICINE CLINIC | Age: 37
End: 2017-12-29

## 2017-12-29 VITALS
BODY MASS INDEX: 21.51 KG/M2 | DIASTOLIC BLOOD PRESSURE: 80 MMHG | TEMPERATURE: 98.1 F | SYSTOLIC BLOOD PRESSURE: 130 MMHG | WEIGHT: 126 LBS | OXYGEN SATURATION: 98 % | HEIGHT: 64 IN | RESPIRATION RATE: 18 BRPM | HEART RATE: 77 BPM

## 2017-12-29 DIAGNOSIS — M54.41 CHRONIC RIGHT-SIDED LOW BACK PAIN WITH RIGHT-SIDED SCIATICA: Primary | ICD-10-CM

## 2017-12-29 DIAGNOSIS — G89.29 CHRONIC RIGHT-SIDED LOW BACK PAIN WITH RIGHT-SIDED SCIATICA: Primary | ICD-10-CM

## 2017-12-29 RX ORDER — CYCLOBENZAPRINE HCL 10 MG
10 TABLET ORAL
Qty: 30 TAB | Refills: 0 | Status: SHIPPED | OUTPATIENT
Start: 2017-12-29 | End: 2018-03-15

## 2017-12-29 RX ORDER — NAPROXEN 500 MG/1
500 TABLET ORAL 2 TIMES DAILY WITH MEALS
Qty: 60 TAB | Refills: 1 | Status: SHIPPED | OUTPATIENT
Start: 2017-12-29 | End: 2018-10-11 | Stop reason: SDUPTHER

## 2017-12-29 NOTE — PROGRESS NOTES
1. Have you been to the ER, urgent care clinic since your last visit? Hospitalized since your last visit? No    2. Have you seen or consulted any other health care providers outside of the [address] [place] [address] since your last visit? Include any pap smears or colon screening. Yes When: 08/2017 [facility]     Patient stated he got hurt at work in 2012, and have a hx of scoliosis. Patient have daily pain for the past two weeks. OTC medication not effective.     PHQ over the last two weeks 12/29/2017   Little interest or pleasure in doing things Not at all   Feeling down, depressed or hopeless Not at all   Total Score PHQ 2 0

## 2017-12-29 NOTE — MR AVS SNAPSHOT
Visit Information Date & Time Provider Department Dept. Phone Encounter #  
 12/29/2017  3:30 PM Denisse Calixto, [address] [id number] Follow-up Instructions Return in about 6 months (around 6/29/2018), or if symptoms worsen or fail to improve. Upcoming Health Maintenance Date Due Pneumococcal 19-64 Medium Risk (1 of 1 - PPSV23) 3/30/2000 DTaP/Tdap/Td series (1 - Tdap) 3/30/2002 MEDICARE YEARLY EXAM 9/21/2018 Allergies as of 12/29/2017  Review Complete On: 12/29/2017 By: Erika Thomas LPN Severity Noted Reaction Type Reactions Aspirin  11/29/2012    Hives Has tolerated ibuprofen in the past  
 Aspirnil  12/04/2014    Hives Has tolerated ibuprofen in the past  
 Iodine  11/29/2012    Hives Shellfish Derived  12/04/2014    Hives Current Immunizations  Never Reviewed Name Date Influenza Vaccine (Quad) PF 9/20/2017 Not reviewed this visit You Were Diagnosed With   
  
 Codes Comments Chronic right-sided low back pain with right-sided sciatica    -  Primary ICD-10-CM: M54.41, G89.29 ICD-9-CM: 724.2, 724.3, 338.29 Vitals BP Pulse Temp Resp Height(growth percentile) Weight(growth percentile) 130/80 (BP 1 Location: Right arm, BP Patient Position: Sitting) 77 98.1 °F (36.7 °C) (Oral) 18 5' 4\" (1.626 m) 126 lb (57.2 kg) SpO2 BMI Smoking Status 98% 21.63 kg/m2 Current Every Day Smoker Vitals History BMI and BSA Data Body Mass Index Body Surface Area  
 21.63 kg/m 2 1.61 m 2 Preferred Pharmacy Pharmacy Name Phone [facility]/PHARMACY [id number] [address] [phone] Your Updated Medication List  
  
   
This list is accurate as of: 12/29/17  3:54 PM.  Always use your most recent med list.  
  
  
  
  
 albuterol 90 mcg/actuation inhaler Commonly known as:  Bird Rivera  
 Take 2 Puffs by inhalation every six (6) hours as needed. albuterol 90 mcg/actuation inhaler Commonly known as:  PROVENTIL HFA, VENTOLIN HFA, PROAIR HFA Take 1 Puff by inhalation every six (6) hours as needed for Wheezing. cyclobenzaprine 10 mg tablet Commonly known as:  FLEXERIL Take 1 Tab by mouth two (2) times daily as needed for Muscle Spasm(s). Not at work or while driving  
  
 gabapentin 300 mg capsule Commonly known as:  NEURONTIN Take 1 Cap by mouth nightly as needed. naproxen 500 mg tablet Commonly known as:  NAPROSYN Take 1 Tab by mouth two (2) times daily (with meals). SUMAtriptan 100 mg tablet Commonly known as:  IMITREX Take 1 Tab by mouth once as needed for Migraine for up to 1 dose. Prescriptions Sent to Pharmacy Refills  
 naproxen (NAPROSYN) 500 mg tablet 1 Sig: Take 1 Tab by mouth two (2) times daily (with meals). Class: Normal  
 Pharmacy: [facility]/pharmacy [address] [place] [address] [address] [address] Ph #: [phone] Route: Oral  
 cyclobenzaprine (FLEXERIL) 10 mg tablet 0 Sig: Take 1 Tab by mouth two (2) times daily as needed for Muscle Spasm(s). Not at work or while driving Class: Normal  
 Pharmacy: [facility]/pharmacy [address] [place] [address] [address] [address] Ph #: [phone] Route: Oral  
  
Follow-up Instructions Return in about 6 months (around 6/29/2018), or if symptoms worsen or fail to improve. Patient Instructions High Cholesterol: Care Instructions Your Care Instructions Cholesterol is a type of fat in your blood. It is needed for many body functions, such as making new cells. Cholesterol is made by your body. It also comes from food you eat. High cholesterol means that you have too much of the fat in your blood. This raises your risk of a heart attack and stroke. LDL and HDL are part of your total cholesterol. LDL is the \"bad\" cholesterol. High LDL can raise your risk for heart disease, heart attack, and stroke. HDL is the \"good\" cholesterol. It helps clear bad cholesterol from the body. High HDL is linked with a lower risk of heart disease, heart attack, and stroke. Your cholesterol levels help your doctor find out your risk for having a heart attack or stroke. You and your doctor can talk about whether you need to lower your risk and what treatment is best for you. A heart-healthy lifestyle along with medicines can help lower your cholesterol and your risk. The way you choose to lower your risk will depend on how high your risk is for heart attack and stroke. It will also depend on how you feel about taking medicines. Follow-up care is a key part of your treatment and safety. Be sure to make and go to all appointments, and call your doctor if you are having problems. It's also a good idea to know your test results and keep a list of the medicines you take. How can you care for yourself at home? · Eat a variety of foods every day. Good choices include fruits, vegetables, whole grains (like oatmeal), dried beans and peas, nuts and seeds, soy products (like tofu), and fat-free or low-fat dairy products. · Replace butter, margarine, and hydrogenated or partially hydrogenated oils with olive and canola oils. (Canola oil margarine without trans fat is fine.) · Replace red meat with fish, poultry, and soy protein (like tofu). · Limit processed and packaged foods like chips, crackers, and cookies. · Bake, broil, or steam foods. Don't clark them. · Be physically active. Get at least 30 minutes of exercise on most days of the week. Walking is a good choice. You also may want to do other activities, such as running, swimming, cycling, or playing tennis or team sports.  
· Stay at a healthy weight or lose weight by making the changes in eating and physical activity listed above. Losing just a small amount of weight, even 5 to 10 pounds, can reduce your risk for having a heart attack or stroke. · Do not smoke. When should you call for help? Watch closely for changes in your health, and be sure to contact your doctor if: 
? · You need help making lifestyle changes. ? · You have questions about your medicine. Where can you learn more? Go to http://evaristo-sylvester.info/. Enter F092 in the search box to learn more about \"High Cholesterol: Care Instructions. \" Current as of: September 21, 2016 Content Version: 11.4 © 9406-4787 [employer]. Care instructions adapted under license by [employer] (which disclaims liability or warranty for this information). If you have questions about a medical condition or this instruction, always ask your healthcare professional. [address] any warranty or liability for your use of this information. Introducing [facility]! Dear Holly Aguila: Thank you for requesting a Rebls account. Our records indicate that you already have an active Rebls account. You can access your account anytime at [email or web address] Did you know that you can access your hospital and ER discharge instructions at any time in Rebls? You can also review all of your test results from your hospital stay or ER visit. Additional Information If you have questions, please visit the Frequently Asked Questions section of the Rebls website at [email or web address] Remember, Rebls is NOT to be used for urgent needs. For medical emergencies, dial 911. Now available from your iPhone and Android! Please provide this summary of care documentation to your next provider. Your primary care clinician is listed as Rosa Ward. If you have any questions after today's visit, please call [phone].

## 2017-12-29 NOTE — PATIENT INSTRUCTIONS

## 2017-12-29 NOTE — PROGRESS NOTES
Subjective: (As above and below)     Chief Complaint   Patient presents with   Rosy Jeffrey is a 40y.o. year old male who presents for f/u on back pain and form completion. He has seen ortho, done PT and has had several injections. He took it upon himself to get a second opinion thru [facility]  who said he may be a surgical candidate - he has a f/u with them next month. He is here asking for form renewal for intermittent leave from work. His back pain is unchanged, although not experiencing sciatica symptoms at this time. He states the gabapentin does not help at all. He has low back pain, occ radiating to right leg. Denies saddle numbness, leg weakness, falls or bowel/bladder dysfunction. He says the pain is like a toothache. Reviewed PmHx, RxHx, FmHx, SocHx, AllgHx and updated in chart. Family History   Problem Relation Age of Onset    Hypertension Father     Diabetes Maternal Grandmother     Cancer Paternal Grandmother        Past Medical History:   Diagnosis Date    Asthma     Low back pain     Scoliosis 1989      Social History     Social History    Marital status:      Spouse name: N/A    Number of children: N/A    Years of education: N/A     Social History Main Topics    Smoking status: Current Every Day Smoker     Packs/day: 0.50    Smokeless tobacco: Never Used      Comment: 10 daily    Alcohol use Yes      Comment: rarely    Drug use: No    Sexual activity: Yes     Partners: Female     Other Topics Concern    None     Social History Narrative    Works at [employer]               Current Outpatient Prescriptions   Medication Sig    naproxen (NAPROSYN) 500 mg tablet Take 1 Tab by mouth two (2) times daily (with meals).  cyclobenzaprine (FLEXERIL) 10 mg tablet Take 1 Tab by mouth two (2) times daily as needed for Muscle Spasm(s).  Not at work or while driving    albuterol (PROVENTIL HFA, VENTOLIN HFA, PROAIR HFA) 90 mcg/actuation inhaler Take 1 Puff by inhalation every six (6) hours as needed for Wheezing.  gabapentin (NEURONTIN) 300 mg capsule Take 1 Cap by mouth nightly as needed.  SUMAtriptan (IMITREX) 100 mg tablet Take 1 Tab by mouth once as needed for Migraine for up to 1 dose.  albuterol (PROVENTIL, VENTOLIN) 90 mcg/actuation inhaler Take 2 Puffs by inhalation every six (6) hours as needed. No current facility-administered medications for this visit. Review of Systems:   Constitutional:    Negative for fever and chills, negative diaphoresis. HEENT:              Negative for neck pain and stiffness. Eyes:                  Negative for visual disturbance, itching, redness or discharge. Respiratory:        Negative for cough and shortness of breath. Cardiovascular:  Negative for chest pain and palpitations. Gastrointestinal: Negative for nausea, vomiting, abdominal pain, diarrhea or constipation. Genitourinary:     Negative for dysuria and frequency. Musculoskeletal: Negative for falls, tenderness and swelling. Skin:                    Negative for rash, masses or lesions. Neurological:       Negative for dizzyness, seizure, loss of consciousness, weakness and numbness.      Objective:     Vitals:    12/29/17 1520   BP: 130/80   Pulse: 77   Resp: 18   Temp: 98.1 °F (36.7 °C)   TempSrc: Oral   SpO2: 98%   Weight: 126 lb (57.2 kg)   Height: 5' 4\" (1.626 m)       Results for orders placed or performed in visit on 98/83/01   METABOLIC PANEL, COMPREHENSIVE   Result Value Ref Range    Glucose 92 65 - 99 mg/dL    BUN 11 6 - 20 mg/dL    Creatinine 1.16 0.76 - 1.27 mg/dL    GFR est non-AA 81 >59 mL/min/1.73    GFR est AA 93 >59 mL/min/1.73    BUN/Creatinine ratio 9 9 - 20    Sodium 139 134 - 144 mmol/L    Potassium 4.6 3.5 - 5.2 mmol/L    Chloride 101 96 - 106 mmol/L    CO2 25 18 - 29 mmol/L    Calcium 9.5 8.7 - 10.2 mg/dL    Protein, total 7.5 6.0 - 8.5 g/dL    Albumin 4.7 3.5 - 5.5 g/dL    GLOBULIN, TOTAL 2.8 1.5 - 4.5 g/dL    A-G Ratio 1.7 1.2 - 2.2    Bilirubin, total 0.3 0.0 - 1.2 mg/dL    Alk. phosphatase 63 39 - 117 IU/L    AST (SGOT) 13 0 - 40 IU/L    ALT (SGPT) 11 0 - 44 IU/L   LIPID PANEL   Result Value Ref Range    Cholesterol, total 219 (H) 100 - 199 mg/dL    Triglyceride 114 0 - 149 mg/dL    HDL Cholesterol 82 >39 mg/dL    VLDL, calculated 23 5 - 40 mg/dL    LDL, calculated 114 (H) 0 - 99 mg/dL   CVD REPORT   Result Value Ref Range    INTERPRETATION Note          Physical Examination: General appearance - alert, well appearing, and in no distress  Chest - clear to auscultation, no wheezes, rales or rhonchi, symmetric air entry  Heart - normal rate, regular rhythm, normal S1, S2, no murmurs, rubs, clicks or gallops  Back exam - ttp to right lumbar paraspinal, gait normal    Assessment/ Plan:   Follow-up Disposition:  Return in about 6 months (around 6/29/2018), or if symptoms worsen or fail to improve. 1. Chronic right-sided low back pain with right-sided sciatica  No other nsaids, short course flexeril (do not drive/work on this med), f/u with ortho as planned    - naproxen (NAPROSYN) 500 mg tablet; Take 1 Tab by mouth two (2) times daily (with meals). Dispense: 60 Tab; Refill: 1  - cyclobenzaprine (FLEXERIL) 10 mg tablet; Take 1 Tab by mouth two (2) times daily as needed for Muscle Spasm(s). Not at work or while driving  Dispense: 30 Tab; Refill: 0        I have discussed the diagnosis with the patient and the intended plan as seen in the above orders. The patient has received an after-visit summary and questions were answered concerning future plans. Pt conveyed understanding of plan. Medication Side Effects and Warnings were discussed with patient: yes  Patient Labs were reviewed: yes  Patient Past Records were reviewed:  yes    Andrea Bradford NP

## 2018-03-15 ENCOUNTER — OFFICE VISIT (OUTPATIENT)
Dept: INTERNAL MEDICINE CLINIC | Age: 38
End: 2018-03-15

## 2018-03-15 VITALS
RESPIRATION RATE: 18 BRPM | DIASTOLIC BLOOD PRESSURE: 81 MMHG | BODY MASS INDEX: 21.36 KG/M2 | WEIGHT: 125.1 LBS | TEMPERATURE: 97.5 F | SYSTOLIC BLOOD PRESSURE: 122 MMHG | HEIGHT: 64 IN | HEART RATE: 96 BPM | OXYGEN SATURATION: 100 %

## 2018-03-15 DIAGNOSIS — Z30.09 VASECTOMY EVALUATION: Primary | ICD-10-CM

## 2018-03-15 DIAGNOSIS — J45.909 ASTHMA, UNSPECIFIED ASTHMA SEVERITY, UNSPECIFIED WHETHER COMPLICATED, UNSPECIFIED WHETHER PERSISTENT: ICD-10-CM

## 2018-03-15 RX ORDER — ALBUTEROL SULFATE 90 UG/1
1 AEROSOL, METERED RESPIRATORY (INHALATION)
Qty: 1 INHALER | Refills: 1 | Status: SHIPPED | OUTPATIENT
Start: 2018-03-15 | End: 2018-10-11 | Stop reason: SDUPTHER

## 2018-03-15 RX ORDER — GABAPENTIN 300 MG/1
300 CAPSULE ORAL 3 TIMES DAILY
COMMUNITY
End: 2018-03-15 | Stop reason: SDUPTHER

## 2018-03-15 RX ORDER — GABAPENTIN 300 MG/1
300 CAPSULE ORAL 3 TIMES DAILY
Qty: 270 CAP | Refills: 0 | Status: SHIPPED | OUTPATIENT
Start: 2018-03-15 | End: 2019-05-10 | Stop reason: SDUPTHER

## 2018-03-15 NOTE — MR AVS SNAPSHOT
[address] [facility] [address] [facility] [address] 
[phone] Patient: Monique Terrazas MRN: [id number] QPM:[id number]/46[birth year][id number] Visit Information Date & Time Provider Department Dept. Phone Encounter #  
 3/15/2018 11:20 AM Denisse Lancaster [place], [address] [id number] Follow-up Instructions Return in about 6 months (around 9/15/2018), or if symptoms worsen or fail to improve. Upcoming Health Maintenance Date Due  
 MEDICARE YEARLY EXAM 9/21/2018 DTaP/Tdap/Td series (2 - Td) 3/15/2028 Allergies as of 3/15/2018  Review Complete On: 3/15/2018 By: Milan Lock LPN Severity Noted Reaction Type Reactions Aspirin  11/29/2012    Hives Has tolerated ibuprofen in the past  
 Aspirnil  12/04/2014    Hives Has tolerated ibuprofen in the past  
 Iodine  11/29/2012    Hives Shellfish Derived  12/04/2014    Hives Current Immunizations  Never Reviewed Name Date Influenza Vaccine (Quad) PF 9/20/2017 Not reviewed this visit You Were Diagnosed With   
  
 Codes Comments Vasectomy evaluation    -  Primary ICD-10-CM: Z30.09 
ICD-9-CM: V25.09 Asthma, unspecified asthma severity, unspecified whether complicated, unspecified whether persistent     ICD-10-CM: J45.909 ICD-9-CM: 493.90 Vitals BP Pulse Temp Resp Height(growth percentile) Weight(growth percentile) 122/81 (BP 1 Location: Left arm, BP Patient Position: Sitting) 96 97.5 °F (36.4 °C) (Oral) 18 5' 4\" (1.626 m) 125 lb 1.6 oz (56.7 kg) SpO2 BMI Smoking Status 100% 21.47 kg/m2 Current Every Day Smoker BMI and BSA Data Body Mass Index Body Surface Area  
 21.47 kg/m 2 1.6 m 2 Preferred Pharmacy Pharmacy Name Phone [id number], [address] [phone] Your Updated Medication List  
  
   
 This list is accurate as of 3/15/18 11:42 AM.  Always use your most recent med list.  
  
  
  
  
 albuterol 90 mcg/actuation inhaler Commonly known as:  PROVENTIL HFA, VENTOLIN HFA, PROAIR HFA Take 1 Puff by inhalation every six (6) hours as needed for Wheezing. gabapentin 300 mg capsule Commonly known as:  NEURONTIN Take 1 Cap by mouth three (3) times daily. naproxen 500 mg tablet Commonly known as:  NAPROSYN Take 1 Tab by mouth two (2) times daily (with meals). SUMAtriptan 100 mg tablet Commonly known as:  IMITREX Take 1 Tab by mouth once as needed for Migraine for up to 1 dose. Prescriptions Sent to Pharmacy Refills  
 albuterol (PROVENTIL HFA, VENTOLIN HFA, PROAIR HFA) 90 mcg/actuation inhaler 1 Sig: Take 1 Puff by inhalation every six (6) hours as needed for Wheezing. Class: Normal  
 Pharmacy: [id number] - [place], [address] [facility] Ph #: [phone] Route: Inhalation  
 gabapentin (NEURONTIN) 300 mg capsule 0 Sig: Take 1 Cap by mouth three (3) times daily. Class: Normal  
 Pharmacy: [id number] - [place], [address] [facility] Ph #: [phone] Route: Oral  
  
We Performed the Following REFERRAL TO UROLOGY [LZM463 Custom] Follow-up Instructions Return in about 6 months (around 9/15/2018), or if symptoms worsen or fail to improve. Referral Information Referral ID Referred By Referred To  
  
 [id number] Danna Mendoza [facility] [address]   
   [place], [address] Visits Status Start Date End Date 1 New Request 3/15/18 3/15/19 If your referral has a status of pending review or denied, additional information will be sent to support the outcome of this decision. Patient Instructions Vasectomy: Care Instructions Your Care Instructions A vasectomy is an operation to make a man sterile, or not able to make a woman pregnant. During a vasectomy, a doctor cuts or blocks the tubes, called the vas deferens, that carry sperm from the testicles to the penis. This keeps sperm from reaching a woman's egg to make a baby when ejaculation occurs during sex. A vasectomy is a simple procedure that can be done at your doctor's office or clinic. A vasectomy will not change your ability to have sex or your sex drive. You will still be able to enjoy sex in the same way as before. You will still produce normal amounts of semen when you climax. The only difference is that the semen will not contain sperm. Vasectomy is considered a permanent method of birth control. You should only consider a vasectomy if you have completed your family or are sure that you do not want children. Follow-up care is a key part of your treatment and safety. Be sure to make and go to all appointments, and call your doctor if you are having problems. It's also a good idea to know your test results and keep a list of the medicines you take. What happens during a vasectomy? · Your genital area will be washed with soap that kills germs. The area may be shaved. · You may be given medicine to relax you and make you sleepy. · You will get a shot to numb the area at the scrotum, where your doctor will make one or two small cuts. The scrotum is the skin sac that holds the testicles. · The doctor reaches the tube, which is just under the skin, through the small cut. The doctor seals, ties, or cuts each tube. The small cuts may be stitched closed. If you had a no-scalpel vasectomy, you may not have stitches at all. · The procedure usually takes less than 30 minutes. You can expect some swelling and minor pain for several days. You will be given instructions after the procedure. Why might you choose a vasectomy? · You do not want to have a child in the future. Vasectomy is considered a permanent procedure. · Vasectomy is a very effective form of birth control. (But it is not 100% effective.) · The one-time cost of a vasectomy may be less than the ongoing cost of other methods, such as birth control pills or condoms. · You want to spare your partner from having a tubal sterilization. Sterilization for women involves surgery, has more risks, and is more expensive. Why would you choose not to have a vasectomy? · You want to avoid surgery and the risks that come with surgery. · Vasectomy is permanent. Some men may not want to limit their option to have children in case of possible life changes such as divorce. · Having a vasectomy may slightly increase your risk for prostate cancer. · You may have other reasons. These may include your [employer]regational beliefs or what your partner wants. When should you call for help? Be sure to contact your doctor if: 
? · You need more information about vasectomy. ? · You want to have a vasectomy. Where can you learn more? Go to http://evaristo-sylvester.info/. Enter O568 in the search box to learn more about \"Vasectomy: Care Instructions. \" Current as of: March 14, 2017 Content Version: 11.4 © 6139-0534 [employer]. Care instructions adapted under license by [employer] (which disclaims liability or warranty for this information). If you have questions about a medical condition or this instruction, always ask your healthcare professional. [place] [address] any warranty or liability for your use of this information. Work-Life Balance: Care Instructions Your Care Instructions Do you ever feel like there is not enough time to do all of the things you have to do, and no time at all for the things you enjoy? If so, you are not alone. On average, people in the [place] have worked more and more hours each year since 1970.  But in recent years, fewer people say they want to take on more at work, even if they would get promoted or get paid more money. More and more workers say they want time to spend with their families and to do things that are important to them. Do you ever feel: · That you always have more and more work to do at your job? · That too many people depend on you every day? · That you never have enough time for your family or friends? · That you never have time for hobbies or things you enjoy? · That each second of your day is scheduled? If you answered \"yes\" to any of these questions, take steps at work and at home to get your life into balance. Follow-up care is a key part of your treatment and safety. Be sure to make and go to all appointments, and call your doctor if you are having problems. How can you care for yourself at home? Manage your time · Focus on the important things. Taking on too much can wear you out. Look at how you spend your time, and redirect your focus. Learn to say \"no\" and let go of things that do not matter. · Set one small goal at a time. Use a day planner. Break large projects into smaller ones. · Ask for help. Let your children, your spouse, your coworkers, and other people in your life help you get things done. · Leave your job at the office. If you give up free time to get more work done, you may pay for it with stress. If your job offers a flexible work schedule, use it to fit your own work style. For instance, come in earlier to have a longer lunch break, or make time for a yoga class or workout during your workday. · Unplug. Do not let technology (such as your cell phone or the Internet) erase the line between your time and your employer's time. Lower job stress Job stress causes trouble at work and at home. At work, you may worry about things you have not had time to do at home. At home, you may worry about your job. This cycle upsets your work-life balance. Lowering your job stress can get your life back in balance. Job stress can be caused by: · Pressure and deadlines. · Heavy workloads or long hours. · Not being allowed to make decisions. · Health and safety hazards. · Feeling you may lose your job. · Unclear or changing job duties. · Too much responsibility. · Work that is very tiring or boring. Do any of these things bother you? Consider talking with your boss to change things. There are some things that you may not be able to control. But even a few small changes might help lower your stress. Take advantage of programs at work Businesses make money and are better off in other ways if their employees are healthy and happy. For this reason, many companies have programs to help balance work life and home life. These programs may include: · Flexible schedules and hours. · Time off for family reasons, education, or community service. · Being able to work from home. · Employee assistance programs to provide counseling. · Child-care programs. Check to see if your company has any of these or other programs that could help you. If not, consider talking to your boss about why work-life balance programs make good business sense. Even if your company does not start an official program, you may be able to get flexible hours, time off, or the ability to do some work from home. Know when to quit If you are truly unhappy because of a stressful job, and if the suggestions here have not worked, it may be time to think about changing jobs or changing careers. But before you quit, take time to research your options. Where can you learn more? Go to http://evaristo-sylvester.info/. Enter A308 in the search box to learn more about \"Work-Life Balance: Care Instructions. \" Current as of: July 26, 2016 Content Version: 11.4 © 5823-0793 [employer]ress.  Care instructions adapted under license by [employer] (which disclaims liability or warranty for this information). If you have questions about a medical condition or this instruction, always ask your healthcare professional. [address] any warranty or liability for your use of this information. Introducing [facility]! Dear Jaki Teixeira: Thank you for requesting a [facility] account. Our records indicate that you already have an active [facility] account. You can access your account anytime at [email or web address] Did you know that you can access your hospital and ER discharge instructions at any time in [facility]? You can also review all of your test results from your hospital stay or ER visit. Additional Information If you have questions, please visit the Frequently Asked Questions section of the [facility] website at [email or web address] Remember, [facility] is NOT to be used for urgent needs. For medical emergencies, dial 911. Now available from your iPhone and Android! Please provide this summary of care documentation to your next provider. Your primary care clinician is listed as Nir Spicer. If you have any questions after today's visit, please call [phone].

## 2018-03-15 NOTE — PROGRESS NOTES
Pt here for   Chief Complaint   Patient presents with    Sleep Problem     Pt c/o having very vivid dreams    Vasectomy     want to dicuss it     1. Have you been to the ER, urgent care clinic since your last visit? Hospitalized since your last visit? No    2. Have you seen or consulted any other health care providers outside of the [address] [place] [address] since your last visit? Include any pap smears or colon screening.  No       Pt denies pain at this time      PHQ over the last two weeks 3/15/2018   Little interest or pleasure in doing things Several days   Feeling down, depressed or hopeless Several days   Total Score PHQ 2 2

## 2018-03-15 NOTE — PATIENT INSTRUCTIONS
Vasectomy: Care Instructions  Your Care Instructions    A vasectomy is an operation to make a man sterile, or not able to make a woman pregnant. During a vasectomy, a doctor cuts or blocks the tubes, called the vas deferens, that carry sperm from the testicles to the penis. This keeps sperm from reaching a woman's egg to make a baby when ejaculation occurs during sex. A vasectomy is a simple procedure that can be done at your doctor's office or clinic. A vasectomy will not change your ability to have sex or your sex drive. You will still be able to enjoy sex in the same way as before. You will still produce normal amounts of semen when you climax. The only difference is that the semen will not contain sperm. Vasectomy is considered a permanent method of birth control. You should only consider a vasectomy if you have completed your family or are sure that you do not want children. Follow-up care is a key part of your treatment and safety. Be sure to make and go to all appointments, and call your doctor if you are having problems. It's also a good idea to know your test results and keep a list of the medicines you take. What happens during a vasectomy? · Your genital area will be washed with soap that kills germs. The area may be shaved. · You may be given medicine to relax you and make you sleepy. · You will get a shot to numb the area at the scrotum, where your doctor will make one or two small cuts. The scrotum is the skin sac that holds the testicles. · The doctor reaches the tube, which is just under the skin, through the small cut. The doctor seals, ties, or cuts each tube. The small cuts may be stitched closed. If you had a no-scalpel vasectomy, you may not have stitches at all. · The procedure usually takes less than 30 minutes. You can expect some swelling and minor pain for several days. You will be given instructions after the procedure. Why might you choose a vasectomy?   · You do not want to have a child in the future. Vasectomy is considered a permanent procedure. · Vasectomy is a very effective form of birth control. (But it is not 100% effective.)  · The one-time cost of a vasectomy may be less than the ongoing cost of other methods, such as birth control pills or condoms. · You want to spare your partner from having a tubal sterilization. Sterilization for women involves surgery, has more risks, and is more expensive. Why would you choose not to have a vasectomy? · You want to avoid surgery and the risks that come with surgery. · Vasectomy is permanent. Some men may not want to limit their option to have children in case of possible life changes such as divorce. · Having a vasectomy may slightly increase your risk for prostate cancer. · You may have other reasons. These may include your Judaism beliefs or what your partner wants. When should you call for help? Be sure to contact your doctor if:  ? · You need more information about vasectomy. ? · You want to have a vasectomy. Where can you learn more? Go to http://evaristo-sylvester.info/. Enter M965 in the search box to learn more about \"Vasectomy: Care Instructions. \"  Current as of: March 14, 2017  Content Version: 11.4  © 2869-7331 [employer] Care instructions adapted under license by [employer] (which disclaims liability or warranty for this information). If you have questions about a medical condition or this instruction, always ask your healthcare professional. [place] [address] any warranty or liability for your use of this information. Work-Life Balance: Care Instructions  Your Care Instructions    Do you ever feel like there is not enough time to do all of the things you have to do, and no time at all for the things you enjoy? If so, you are not alone. On average, people in the [place] have worked more and more hours each year since 1970.  But in recent years, fewer people say they want to take on more at work, even if they would get promoted or get paid more money. More and more workers say they want time to spend with their families and to do things that are important to them. Do you ever feel:  · That you always have more and more work to do at your job? · That too many people depend on you every day? · That you never have enough time for your family or friends? · That you never have time for hobbies or things you enjoy? · That each second of your day is scheduled? If you answered \"yes\" to any of these questions, take steps at work and at home to get your life into balance. Follow-up care is a key part of your treatment and safety. Be sure to make and go to all appointments, and call your doctor if you are having problems. How can you care for yourself at home? Manage your time  · Focus on the important things. Taking on too much can wear you out. Look at how you spend your time, and redirect your focus. Learn to say \"no\" and let go of things that do not matter. · Set one small goal at a time. Use a day planner. Break large projects into smaller ones. · Ask for help. Let your children, your spouse, your coworkers, and other people in your life help you get things done. · Leave your job at the office. If you give up free time to get more work done, you may pay for it with stress. If your job offers a flexible work schedule, use it to fit your own work style. For instance, come in earlier to have a longer lunch break, or make time for a yoga class or workout during your workday. · Unplug. Do not let technology (such as your cell phone or the Internet) erase the line between your time and your employer's time. Lower job stress  Job stress causes trouble at work and at home. At work, you may worry about things you have not had time to do at home. At home, you may worry about your job. This cycle upsets your work-life balance.  Lowering your job stress can get your life back in balance. Job stress can be caused by:  · Pressure and deadlines. · Heavy workloads or long hours. · Not being allowed to make decisions. · Health and safety hazards. · Feeling you may lose your job. · Unclear or changing job duties. · Too much responsibility. · Work that is very tiring or boring. Do any of these things bother you? Consider talking with your boss to change things. There are some things that you may not be able to control. But even a few small changes might help lower your stress. Take advantage of programs at work  Businesses make money and are better off in other ways if their employees are healthy and happy. For this reason, many companies have programs to help balance work life and home life. These programs may include:  · Flexible schedules and hours. · Time off for family reasons, education, or community service. · Being able to work from home. · Employee assistance programs to provide counseling. · Child-care programs. Check to see if your company has any of these or other programs that could help you. If not, consider talking to your boss about why work-life balance programs make good business sense. Even if your company does not start an official program, you may be able to get flexible hours, time off, or the ability to do some work from home. Know when to quit  If you are truly unhappy because of a stressful job, and if the suggestions here have not worked, it may be time to think about changing jobs or changing careers. But before you quit, take time to research your options. Where can you learn more? Go to http://evaristo-sylvester.info/. Enter H061 in the search box to learn more about \"Work-Life Balance: Care Instructions. \"  Current as of: July 26, 2016  Content Version: 11.4  © 1029-2858 [employer], [employer].  Care instructions adapted under license by [employer] (which disclaims liability or warranty for this information). If you have questions about a medical condition or this instruction, always ask your healthcare professional. [place] [address] any warranty or liability for your use of this information.

## 2018-03-15 NOTE — PROGRESS NOTES
Subjective: (As above and below)     Chief Complaint   Patient presents with    Sleep Problem     Pt c/o having very vivid dreams    Vasectomy     want to dicuss it     Mary Daugherty is a 40y.o. year old male who presents for following concerns:    Dreams: he reports very vivid dreams x 1.5 weeks, sometimes nightmares but not always. No new meds, no illicit drugs. He has had some increased stress at work. No late meals. No s/s of sleep apnea. No daytime hallucinations, depression, panic attacks      Vasectomy: he asks for referral to discuss vasectomy. He has 3 children - ranging from 11to 12years old. Since last visit, he has f/u with ortho and surgical interventions are being held off at this time - he reduced his work hours from 70+ to 50 and this has helped          Reviewed PmHx, RxHx, FmHx, SocHx, AllgHx and updated in chart. Family History   Problem Relation Age of Onset    Hypertension Father     Diabetes Maternal Grandmother     Cancer Paternal Grandmother        Past Medical History:   Diagnosis Date    Asthma     Low back pain     Scoliosis 1989      Social History     Social History    Marital status:      Spouse name: N/A    Number of children: N/A    Years of education: N/A     Social History Main Topics    Smoking status: Current Every Day Smoker     Packs/day: 0.50    Smokeless tobacco: Never Used      Comment: 10 daily    Alcohol use Yes      Comment: rarely    Drug use: No    Sexual activity: Yes     Partners: Female     Other Topics Concern    None     Social History Narrative    Works at [employer]        3/2018: recently  from his wife. Has 3 children               Current Outpatient Prescriptions   Medication Sig    albuterol (PROVENTIL HFA, VENTOLIN HFA, PROAIR HFA) 90 mcg/actuation inhaler Take 1 Puff by inhalation every six (6) hours as needed for Wheezing.     gabapentin (NEURONTIN) 300 mg capsule Take 1 Cap by mouth three (3) times daily.    naproxen (NAPROSYN) 500 mg tablet Take 1 Tab by mouth two (2) times daily (with meals).  SUMAtriptan (IMITREX) 100 mg tablet Take 1 Tab by mouth once as needed for Migraine for up to 1 dose. No current facility-administered medications for this visit. Review of Systems:   Constitutional:    Negative for fever and chills, negative diaphoresis. HEENT:              Negative for neck pain and stiffness. Eyes:                  Negative for visual disturbance, itching, redness or discharge. Respiratory:        Negative for cough and shortness of breath. Cardiovascular:  Negative for chest pain and palpitations. Gastrointestinal: Negative for nausea, vomiting, abdominal pain, diarrhea or constipation. Genitourinary:     Negative for dysuria and frequency. Musculoskeletal: Negative for falls, tenderness and swelling. Skin:                    Negative for rash, masses or lesions. Neurological:       Negative for dizzyness, seizure, loss of consciousness, weakness and numbness. Objective:     Vitals:    03/15/18 1120   BP: 122/81   Pulse: 96   Resp: 18   Temp: 97.5 °F (36.4 °C)   TempSrc: Oral   SpO2: 100%   Weight: 125 lb 1.6 oz (56.7 kg)   Height: 5' 4\" (1.626 m)       Results for orders placed or performed in visit on 54/84/09   METABOLIC PANEL, COMPREHENSIVE   Result Value Ref Range    Glucose 92 65 - 99 mg/dL    BUN 11 6 - 20 mg/dL    Creatinine 1.16 0.76 - 1.27 mg/dL    GFR est non-AA 81 >59 mL/min/1.73    GFR est AA 93 >59 mL/min/1.73    BUN/Creatinine ratio 9 9 - 20    Sodium 139 134 - 144 mmol/L    Potassium 4.6 3.5 - 5.2 mmol/L    Chloride 101 96 - 106 mmol/L    CO2 25 18 - 29 mmol/L    Calcium 9.5 8.7 - 10.2 mg/dL    Protein, total 7.5 6.0 - 8.5 g/dL    Albumin 4.7 3.5 - 5.5 g/dL    GLOBULIN, TOTAL 2.8 1.5 - 4.5 g/dL    A-G Ratio 1.7 1.2 - 2.2    Bilirubin, total 0.3 0.0 - 1.2 mg/dL    Alk.  phosphatase 63 39 - 117 IU/L    AST (SGOT) 13 0 - 40 IU/L    ALT (SGPT) 11 0 - 44 IU/L   LIPID PANEL   Result Value Ref Range    Cholesterol, total 219 (H) 100 - 199 mg/dL    Triglyceride 114 0 - 149 mg/dL    HDL Cholesterol 82 >39 mg/dL    VLDL, calculated 23 5 - 40 mg/dL    LDL, calculated 114 (H) 0 - 99 mg/dL   CVD REPORT   Result Value Ref Range    INTERPRETATION Note          Physical Examination: General appearance - alert, well appearing, and in no distress  Chest - clear to auscultation, no wheezes, rales or rhonchi, symmetric air entry  Heart - normal rate, regular rhythm, normal S1, S2, no murmurs, rubs, clicks or gallops  Skin -No lower extremity edema      Assessment/ Plan:   Follow-up Disposition:  Return in about 6 months (around 9/15/2018), or if symptoms worsen or fail to improve. Dreams likely r/t recent work stressors. Discussed stress mgmt. rtc INI    1. Vasectomy evaluation    - REFERRAL TO UROLOGY    2. Asthma, unspecified asthma severity, unspecified whether complicated, unspecified whether persistent  Refill, rare use  - albuterol (PROVENTIL HFA, VENTOLIN HFA, PROAIR HFA) 90 mcg/actuation inhaler; Take 1 Puff by inhalation every six (6) hours as needed for Wheezing. Dispense: 1 Inhaler; Refill: 1        I have discussed the diagnosis with the patient and the intended plan as seen in the above orders. The patient has received an after-visit summary and questions were answered concerning future plans. Pt conveyed understanding of plan. Medication Side Effects and Warnings were discussed with patient: yes  Patient Labs were reviewed: yes  Patient Past Records were reviewed:  yes    Nubia Meléndez.  Shmuel Small NP

## 2018-03-17 ENCOUNTER — OFFICE VISIT (OUTPATIENT)
Dept: URGENT CARE | Age: 38
End: 2018-03-17

## 2018-03-17 VITALS
WEIGHT: 125 LBS | TEMPERATURE: 98.8 F | BODY MASS INDEX: 21.34 KG/M2 | OXYGEN SATURATION: 98 % | HEIGHT: 64 IN | RESPIRATION RATE: 18 BRPM | DIASTOLIC BLOOD PRESSURE: 84 MMHG | HEART RATE: 89 BPM | SYSTOLIC BLOOD PRESSURE: 133 MMHG

## 2018-03-17 DIAGNOSIS — R07.9 CHEST PAIN, UNSPECIFIED TYPE: Primary | ICD-10-CM

## 2018-03-17 NOTE — PATIENT INSTRUCTIONS
Chest Pain: Care Instructions  Your Care Instructions    There are many things that can cause chest pain. Some are not serious and will get better on their own in a few days. But some kinds of chest pain need more testing and treatment. Your doctor may have recommended a follow-up visit in the next 8 to 12 hours. If you are not getting better, you may need more tests or treatment. Even though your doctor has released you, you still need to watch for any problems. The doctor carefully checked you, but sometimes problems can develop later. If you have new symptoms or if your symptoms do not get better, get medical care right away. If you have worse or different chest pain or pressure that lasts more than 5 minutes or you passed out (lost consciousness), call 911 or seek other emergency help right away. A medical visit is only one step in your treatment. Even if you feel better, you still need to do what your doctor recommends, such as going to all suggested follow-up appointments and taking medicines exactly as directed. This will help you recover and help prevent future problems. How can you care for yourself at home? · Rest until you feel better. · Take your medicine exactly as prescribed. Call your doctor if you think you are having a problem with your medicine. · Do not drive after taking a prescription pain medicine. When should you call for help? Call 911 if:  ? · You passed out (lost consciousness). ? · You have severe difficulty breathing. ? · You have symptoms of a heart attack. These may include:  ¨ Chest pain or pressure, or a strange feeling in your chest.  ¨ Sweating. ¨ Shortness of breath. ¨ Nausea or vomiting. ¨ Pain, pressure, or a strange feeling in your back, neck, jaw, or upper belly or in one or both shoulders or arms. ¨ Lightheadedness or sudden weakness. ¨ A fast or irregular heartbeat.   After you call 911, the  may tell you to chew 1 adult-strength or 2 to 4 low-dose aspirin. Wait for an ambulance. Do not try to drive yourself. ?Call your doctor today if:  ? · You have any trouble breathing. ? · Your chest pain gets worse. ? · You are dizzy or lightheaded, or you feel like you may faint. ? · You are not getting better as expected. ? · You are having new or different chest pain. Where can you learn more? Go to http://evaristo-sylvester.info/. Enter A120 in the search box to learn more about \"Chest Pain: Care Instructions. \"  Current as of: March 20, 2017  Content Version: 11.4  © 4461-4308 [employer]. Care instructions adapted under license by [employer] (which disclaims liability or warranty for this information). If you have questions about a medical condition or this instruction, always ask your healthcare professional. [address] any warranty or liability for your use of this information. See You doctor in 2-3 days.

## 2018-03-17 NOTE — MR AVS SNAPSHOT
[address] [place] [address] 
[phone] Patient: Monique Terrazas MRN: [id number] RZM:[birth year] Visit Information Date & Time Provider Department Dept. Phone Encounter #  
 3/17/2018  5:30 PM [facility] [phone] [id number] Upcoming Health Maintenance Date Due  
 MEDICARE YEARLY EXAM 9/21/2018 DTaP/Tdap/Td series (2 - Td) 3/15/2028 Allergies as of 3/17/2018  Review Complete On: 3/17/2018 By: Kathleen Laura RN Severity Noted Reaction Type Reactions Aspirin  11/29/2012    Hives Has tolerated ibuprofen in the past  
 Aspirnil  12/04/2014    Hives Has tolerated ibuprofen in the past  
 Iodine  11/29/2012    Hives Shellfish Derived  12/04/2014    Hives Current Immunizations  Never Reviewed Name Date Influenza Vaccine (Quad) PF 9/20/2017 Not reviewed this visit You Were Diagnosed With   
  
 Codes Comments Chest pain, unspecified type    -  Primary ICD-10-CM: R07.9 ICD-9-CM: 786.50 Vitals BP Pulse Temp Resp Height(growth percentile) Weight(growth percentile) 133/84 89 98.8 °F (37.1 °C) 18 5' 4\" (1.626 m) 125 lb (56.7 kg) SpO2 BMI Smoking Status 98% 21.46 kg/m2 Current Every Day Smoker BMI and BSA Data Body Mass Index Body Surface Area  
 21.46 kg/m 2 1.6 m 2 Preferred Pharmacy Pharmacy Name Phone [id number] [address] [facility] [phone] Your Updated Medication List  
  
   
This list is accurate as of 3/17/18  6:19 PM.  Always use your most recent med list.  
  
  
  
  
 albuterol 90 mcg/actuation inhaler Commonly known as:  PROVENTIL HFA, VENTOLIN HFA, PROAIR HFA Take 1 Puff by inhalation every six (6) hours as needed for Wheezing. gabapentin 300 mg capsule Commonly known as:  NEURONTIN Take 1 Cap by mouth three (3) times daily. naproxen 500 mg tablet Commonly known as:  NAPROSYN Take 1 Tab by mouth two (2) times daily (with meals). SUMAtriptan 100 mg tablet Commonly known as:  IMITREX Take 1 Tab by mouth once as needed for Migraine for up to 1 dose. We Performed the Following EKG, 12 LEAD, INITIAL [67730 CPT(R)] Patient Instructions Chest Pain: Care Instructions Your Care Instructions There are many things that can cause chest pain. Some are not serious and will get better on their own in a few days. But some kinds of chest pain need more testing and treatment. Your doctor may have recommended a follow-up visit in the next 8 to 12 hours. If you are not getting better, you may need more tests or treatment. Even though your doctor has released you, you still need to watch for any problems. The doctor carefully checked you, but sometimes problems can develop later. If you have new symptoms or if your symptoms do not get better, get medical care right away. If you have worse or different chest pain or pressure that lasts more than 5 minutes or you passed out (lost consciousness), call 911 or seek other emergency help right away. A medical visit is only one step in your treatment. Even if you feel better, you still need to do what your doctor recommends, such as going to all suggested follow-up appointments and taking medicines exactly as directed. This will help you recover and help prevent future problems. How can you care for yourself at home? · Rest until you feel better. · Take your medicine exactly as prescribed. Call your doctor if you think you are having a problem with your medicine. · Do not drive after taking a prescription pain medicine. When should you call for help? Call 911 if: 
? · You passed out (lost consciousness). ? · You have severe difficulty breathing. ? · You have symptoms of a heart attack. These may include: ¨ Chest pain or pressure, or a strange feeling in your chest. 
¨ Sweating. ¨ Shortness of breath. ¨ Nausea or vomiting. ¨ Pain, pressure, or a strange feeling in your back, neck, jaw, or upper belly or in one or both shoulders or arms. ¨ Lightheadedness or sudden weakness. ¨ A fast or irregular heartbeat. After you call 911, the  may tell you to chew 1 adult-strength or 2 to 4 low-dose aspirin. Wait for an ambulance. Do not try to drive yourself. ?Call your doctor today if: 
? · You have any trouble breathing. ? · Your chest pain gets worse. ? · You are dizzy or lightheaded, or you feel like you may faint. ? · You are not getting better as expected. ? · You are having new or different chest pain. Where can you learn more? Go to http://evaristo-sylvester.info/. Enter A120 in the search box to learn more about \"Chest Pain: Care Instructions. \" Current as of: March 20, 2017 Content Version: 11.4 © 4773-8455 [employer]. Care instructions adapted under license by [employer] (which disclaims liability or warranty for this information). If you have questions about a medical condition or this instruction, always ask your healthcare professional. [place] [address] any warranty or liability for your use of this information. See You doctor in 2-3 days. Patient Instructions History Introducing [facility]! Dear Vanesa Hill: Thank you for requesting a Packback account. Our records indicate that you already have an active Packback account. You can access your account anytime at [email or web address] Did you know that you can access your hospital and ER discharge instructions at any time in Packback? You can also review all of your test results from your hospital stay or ER visit. Additional Information If you have questions, please visit the Frequently Asked Questions section of the [employer] website at [email or web address]. [employer]. [email or web address] Remember, [employer] is NOT to be used for urgent needs. For medical emergencies, dial 911. Now available from your iPhone and Android! Please provide this summary of care documentation to your next provider. Your primary care clinician is listed as Melissa Rizo. If you have any questions after today's visit, please call [phone].

## 2018-03-17 NOTE — PROGRESS NOTES
HPI Comments: Reports pain began yesterday while he was with his daughter about 9pm. He had approximately 7 episodes of substernal pressure like pain that lasted less than 30 seconds. The pain was not related to any activity. He has not had pain like this before. Denies new meds, supplements, excess caffeine use (energy drinks) or new activities. No cardiac history. Patient is a 40 y.o. male presenting with chest pain. The history is provided by the patient. Chest Pain    The current episode started yesterday. The problem has been gradually improving. Duration of episode(s) is 30 seconds. The pain is present in the substernal region. The quality of the pain is described as pressure-like, dull and intermittent. The pain does not radiate. Pertinent negatives include no diaphoresis, no dizziness, no fever, no nausea, no palpitations, no shortness of breath and no vomiting. He has tried nothing for the symptoms. Risk factors include male gender, stress and smoking/tobacco exposure. His past medical history does not include DM or HTN. Past Medical History:   Diagnosis Date    Asthma     Low back pain     Scoliosis 1989        Past Surgical History:   Procedure Laterality Date    HX OTHER SURGICAL      sx to enlarge esophagus as infant    HX OTHER SURGICAL      hernia repair as infant         Family History   Problem Relation Age of Onset    Hypertension Father     Diabetes Maternal Grandmother     Cancer Paternal Grandmother         Social History     Social History    Marital status: LEGALLY      Spouse name: N/A    Number of children: N/A    Years of education: N/A     Occupational History    Not on file.      Social History Main Topics    Smoking status: Current Every Day Smoker     Packs/day: 0.50    Smokeless tobacco: Never Used      Comment: 10 daily    Alcohol use Yes      Comment: rarely    Drug use: No    Sexual activity: Yes     Partners: Female     Other Topics Concern  Not on file     Social History Narrative    Works at [employer]        3/2018: recently  from his wife. Has 3 children                     ALLERGIES: Aspirin; Aspirnil; Iodine; and Shellfish derived    Review of Systems   Constitutional: Negative for diaphoresis and fever. Respiratory: Negative for shortness of breath. Cardiovascular: Positive for chest pain. Negative for palpitations. Gastrointestinal: Negative for nausea and vomiting. Neurological: Negative for dizziness. Vitals:    03/17/18 1736   BP: 133/84   Pulse: 89   Resp: 18   Temp: 98.8 °F (37.1 °C)   SpO2: 98%   Weight: 125 lb (56.7 kg)   Height: 5' 4\" (1.626 m)       Physical Exam   Constitutional: He is oriented to person, place, and time. He appears well-developed and well-nourished. Cardiovascular: Normal rate, regular rhythm and normal heart sounds. Pulmonary/Chest: Effort normal and breath sounds normal. No respiratory distress. He has no wheezes. He has no rales. Neurological: He is alert and oriented to person, place, and time. Skin: Skin is warm and dry. Psychiatric: He has a normal mood and affect. His behavior is normal.   Nursing note and vitals reviewed. MDM     Differential Diagnosis; Clinical Impression; Plan:     Chest pain-not likely cardiac in origin. Reviewed past record and patient saw his pcp last week reporting increased stress and vivid dreams. The ekg findings of LVH are likely due to his body habitus and lead placement. Will f/u with is pcp and advised to go to the ED if the pain increases in frequency or changes.   Amount and/or Complexity of Data Reviewed:    Review and summarize past medical records:  Yes  Progress:   Patient progress:  Stable      Procedures

## 2018-07-05 ENCOUNTER — TELEPHONE (OUTPATIENT)
Dept: INTERNAL MEDICINE CLINIC | Age: 38
End: 2018-07-05

## 2018-08-28 ENCOUNTER — OFFICE VISIT (OUTPATIENT)
Dept: INTERNAL MEDICINE CLINIC | Age: 38
End: 2018-08-28

## 2018-08-28 VITALS
HEART RATE: 110 BPM | DIASTOLIC BLOOD PRESSURE: 80 MMHG | WEIGHT: 122.2 LBS | TEMPERATURE: 98.5 F | SYSTOLIC BLOOD PRESSURE: 118 MMHG | HEIGHT: 64 IN | BODY MASS INDEX: 20.86 KG/M2 | OXYGEN SATURATION: 98 % | RESPIRATION RATE: 16 BRPM

## 2018-08-28 DIAGNOSIS — M54.41 CHRONIC RIGHT-SIDED LOW BACK PAIN WITH RIGHT-SIDED SCIATICA: Primary | ICD-10-CM

## 2018-08-28 DIAGNOSIS — G89.29 CHRONIC RIGHT-SIDED LOW BACK PAIN WITH RIGHT-SIDED SCIATICA: Primary | ICD-10-CM

## 2018-08-28 RX ORDER — METHYLPREDNISOLONE 4 MG/1
TABLET ORAL
Qty: 1 DOSE PACK | Refills: 0 | Status: SHIPPED | OUTPATIENT
Start: 2018-08-28 | End: 2019-01-14 | Stop reason: ALTCHOICE

## 2018-08-28 NOTE — PROGRESS NOTES
Chief Complaint Patient presents with  Back Pain  
  x 2 weeks  Difficulty Walking  Hip Pain  
  tingling on lower RT side of the body 1. Have you been to the ER, urgent care clinic since your last visit? Hospitalized since your last visit? No 
 
2. Have you seen or consulted any other health care providers outside of the [address] [place] [address] since your last visit? Include any pap smears or colon screening. No 
 
Fall Risk Assessment, last 12 mths 8/28/2018 Able to walk? Yes Fall in past 12 months? No  
 
 
Pt has complaints of poss side effects to gabapentin.

## 2018-08-28 NOTE — LETTER
NOTIFICATION RETURN TO WORK / SCHOOL 
 
8/28/2018 3:58 PM 
 
Mr. Keena Patel [address] [place] [phone] To Whom It May Concern: 
 
Keena Patel is currently under the care of Analia N Shaunna Sorensen. He will return to work/school on: 8/31/18. If there are questions or concerns please have the patient contact our office. Sincerely, Tejal Merino MD

## 2018-08-29 NOTE — PROGRESS NOTES
Bibi Feldman is a 41 y.o. male and presents with Back Pain (x 2 weeks); Difficulty Walking; and Hip Pain (tingling on lower RT side of the body) Mabeline Sink Subjective: 
Chart reviwed by me, Forrest Ashraf notes. Pt has known lumbar radiculopathy, has done PT and has had lumbar injections and was scheduled for surgical intervention, when he reconsidered. Pt comes-in today w recurrence of lbp w radiation down rt leg. Pt denies le weakness, bowel or bladder dysfxn. Pt requests a note off and steroids, as this has temporarily given him pain relief in the past. 
Pt does not recall a precipitating factor and his pain is rated as 8/10. Review of Systems Review of systems (12) negative, except noted above. Past Medical History:  
Diagnosis Date  Asthma  Low back pain [facility] Past Surgical History:  
Procedure Laterality Date  HX OTHER SURGICAL    
 sx to enlarge esophagus as infant  HX OTHER SURGICAL    
 hernia repair as infant Social History Social History  Marital status: LEGALLY  Spouse name: N/A  
 Number of children: N/A  
 Years of education: N/A Social History Main Topics  Smoking status: Current Every Day Smoker Packs/day: 0.50  Smokeless tobacco: Never Used Comment: 10 daily  Alcohol use Yes Comment: rarely  Drug use: No  
 Sexual activity: Yes  
  Partners: Female Other Topics Concern  None Social History Narrative Works at [employer] 3/2018: recently  from his wife. Has 3 children Family History Problem Relation Age of Onset  Hypertension Father  Diabetes Maternal Grandmother  Cancer Paternal Grandmother Current Outpatient Prescriptions Medication Sig Dispense Refill  methylPREDNISolone (MEDROL DOSEPACK) 4 mg tablet Use as directed 1 Dose Pack 0  
 albuterol (PROVENTIL HFA, VENTOLIN HFA, PROAIR HFA) 90 mcg/actuation inhaler Take 1 Puff by inhalation every six (6) hours as needed for Wheezing. 1 Inhaler 1  
 gabapentin (NEURONTIN) 300 mg capsule Take 1 Cap by mouth three (3) times daily. 270 Cap 0  
 naproxen (NAPROSYN) 500 mg tablet Take 1 Tab by mouth two (2) times daily (with meals). 60 Tab 1  
 SUMAtriptan (IMITREX) 100 mg tablet Take 1 Tab by mouth once as needed for Migraine for up to 1 dose. 20 Tab 0 Allergies Allergen Reactions  Aspirin Hives Has tolerated ibuprofen in the past  
 Aspirnil Hives Has tolerated ibuprofen in the past  
 Iodine Hives  Shellfish Derived Hives Objective: 
Visit Vitals  /80 (BP 1 Location: Left arm, BP Patient Position: Sitting)  Pulse (!) 110  Temp 98.5 °F (36.9 °C)  Resp 16  
 Ht 5' 4\" (1.626 m)  Wt 122 lb 3.2 oz (55.4 kg)  SpO2 98%  BMI 20.98 kg/m2 Physical Exam:  
General appearance - alert, well appearing, and in no distress smells of tobacco 
Mental status - alert, oriented to person, place, and time EYE-EOMI Neck - supple, Chest - symmetric air entry Abdomen - obese Ext-no pedal edema, no clubbing or cyanosis Skin-Warm and dry. no hyperpigmentation, vitiligo, or suspicious lesions Back-no CVA tenderness, no spinal point tenderness Neuro -alert, oriented, normal speech, no focal findings or movement disorder noted 
 -unable to perform straight leg test due to pts discomfort Results for orders placed or performed in visit on 09/20/17 METABOLIC PANEL, COMPREHENSIVE Result Value Ref Range Glucose 92 65 - 99 mg/dL BUN 11 6 - 20 mg/dL Creatinine 1.16 0.76 - 1.27 mg/dL GFR est non-AA 81 >59 mL/min/1.73 GFR est AA 93 >59 mL/min/1.73  
 BUN/Creatinine ratio 9 9 - 20 Sodium 139 134 - 144 mmol/L Potassium 4.6 3.5 - 5.2 mmol/L Chloride 101 96 - 106 mmol/L  
 CO2 25 18 - 29 mmol/L Calcium 9.5 8.7 - 10.2 mg/dL Protein, total 7.5 6.0 - 8.5 g/dL Albumin 4.7 3.5 - 5.5 g/dL GLOBULIN, TOTAL 2.8 1.5 - 4.5 g/dL A-G Ratio 1.7 1.2 - 2.2 Bilirubin, total 0.3 0.0 - 1.2 mg/dL Alk. phosphatase 63 39 - 117 IU/L  
 AST (SGOT) 13 0 - 40 IU/L  
 ALT (SGPT) 11 0 - 44 IU/L  
LIPID PANEL Result Value Ref Range Cholesterol, total 219 (H) 100 - 199 mg/dL Triglyceride 114 0 - 149 mg/dL HDL Cholesterol 82 >39 mg/dL VLDL, calculated 23 5 - 40 mg/dL LDL, calculated 114 (H) 0 - 99 mg/dL CVD REPORT Result Value Ref Range INTERPRETATION Note Assessment/Plan: ICD-10-CM ICD-9-CM 1. Chronic right-sided low back pain with right-sided sciatica M54.41 724.2 methylPREDNISolone (MEDROL DOSEPACK) 4 mg tablet G89.29 724.3   
  338.29 Orders Placed This Encounter  methylPREDNISolone (MEDROL DOSEPACK) 4 mg tablet Sig: Use as directed Dispense:  1 Dose Pack Refill:  0  
 
1. Chronic right-sided low back pain with right-sided sciatica RTW note given for 8/31/18 Recommend moist heat and NSAIDS AFTER steroid dose mita completed, if needed Urged pt to reconsider surgery 
- methylPREDNISolone (MEDROL DOSEPACK) 4 mg tablet; Use as directed  Dispense: 1 Dose Pack; Refill: 0 There are no Patient Instructions on file for this visit. Follow-up Disposition: 
Return if symptoms worsen or fail to improve. I have reviewed with the patient details of the assessment and plan and all questions were answered. Relevent patient education was performed. The most recent lab findings were reviewed with the patient. An After Visit Summary was printed and given to the patient.

## 2018-10-11 DIAGNOSIS — G89.29 CHRONIC RIGHT-SIDED LOW BACK PAIN WITH RIGHT-SIDED SCIATICA: ICD-10-CM

## 2018-10-11 DIAGNOSIS — M54.41 CHRONIC RIGHT-SIDED LOW BACK PAIN WITH RIGHT-SIDED SCIATICA: ICD-10-CM

## 2018-10-11 DIAGNOSIS — J45.909 ASTHMA, UNSPECIFIED ASTHMA SEVERITY, UNSPECIFIED WHETHER COMPLICATED, UNSPECIFIED WHETHER PERSISTENT: ICD-10-CM

## 2018-10-11 DIAGNOSIS — G43.009 MIGRAINE WITHOUT AURA AND WITHOUT STATUS MIGRAINOSUS, NOT INTRACTABLE: ICD-10-CM

## 2018-10-11 RX ORDER — NAPROXEN 500 MG/1
500 TABLET ORAL 2 TIMES DAILY WITH MEALS
Qty: 60 TAB | Refills: 1 | Status: SHIPPED | OUTPATIENT
Start: 2018-10-11 | End: 2019-01-14 | Stop reason: SDUPTHER

## 2018-10-11 RX ORDER — SUMATRIPTAN 100 MG/1
100 TABLET, FILM COATED ORAL
Qty: 20 TAB | Refills: 0 | Status: SHIPPED | OUTPATIENT
Start: 2018-10-11 | End: 2018-10-11

## 2018-10-11 RX ORDER — ALBUTEROL SULFATE 90 UG/1
1 AEROSOL, METERED RESPIRATORY (INHALATION)
Qty: 1 INHALER | Refills: 1 | Status: SHIPPED | OUTPATIENT
Start: 2018-10-11 | End: 2019-05-10 | Stop reason: SDUPTHER

## 2019-01-14 ENCOUNTER — OFFICE VISIT (OUTPATIENT)
Dept: INTERNAL MEDICINE CLINIC | Age: 39
End: 2019-01-14

## 2019-01-14 VITALS
SYSTOLIC BLOOD PRESSURE: 132 MMHG | BODY MASS INDEX: 22.72 KG/M2 | TEMPERATURE: 97.1 F | HEIGHT: 64 IN | OXYGEN SATURATION: 96 % | WEIGHT: 133.1 LBS | DIASTOLIC BLOOD PRESSURE: 86 MMHG | RESPIRATION RATE: 16 BRPM | HEART RATE: 94 BPM

## 2019-01-14 DIAGNOSIS — G89.29 CHRONIC RIGHT-SIDED LOW BACK PAIN WITH RIGHT-SIDED SCIATICA: ICD-10-CM

## 2019-01-14 DIAGNOSIS — M54.41 CHRONIC RIGHT-SIDED LOW BACK PAIN WITH RIGHT-SIDED SCIATICA: ICD-10-CM

## 2019-01-14 DIAGNOSIS — Z00.00 ROUTINE PHYSICAL EXAMINATION: Primary | ICD-10-CM

## 2019-01-14 DIAGNOSIS — E78.2 MIXED HYPERLIPIDEMIA: ICD-10-CM

## 2019-01-14 PROBLEM — F17.200 SMOKER: Status: ACTIVE | Noted: 2019-01-14

## 2019-01-14 RX ORDER — NAPROXEN 500 MG/1
500 TABLET ORAL
Qty: 60 TAB | Refills: 1 | Status: SHIPPED | OUTPATIENT
Start: 2019-01-14 | End: 2019-05-10 | Stop reason: SDUPTHER

## 2019-01-14 NOTE — PROGRESS NOTES
Pt here for   Chief Complaint   Patient presents with    Form Completion     Recheck for FMLA paperwork     1. Have you been to the ER, urgent care clinic since your last visit? Hospitalized since your last visit? No    2. Have you seen or consulted any other health care providers outside of the [address] [place] [address] since your last visit? Include any pap smears or colon screening.  no             Pt c/o pain 3 of 10, Pt took naprosyn for pain today        PHQ over the last two weeks 1/14/2019   Little interest or pleasure in doing things Not at all   Feeling down, depressed, irritable, or hopeless Not at all   Total Score PHQ 2 0

## 2019-01-14 NOTE — PATIENT INSTRUCTIONS

## 2019-01-14 NOTE — PROGRESS NOTES
Subjective: (As above and below)     Chief Complaint   Patient presents with    Form Completion     Recheck for FMLA paperwork     Eduard Prakash is a 41y.o. year old male who presents for physical and  completion, FMLA for intermittent low back pain. Since his last visit here in 8/2018, his back has been fair. He still does not want to proceed with surgery as previously recommended by ortho. He uses naproxen, occasional gabapentin. He works on [employer] learned by PT at home. Denies saddle numbness, leg weakness, falls or bowel/bladder dysfunction. Wt Readings from Last 3 Encounters:   01/14/19 133 lb 1.6 oz (60.4 kg)   08/28/18 122 lb 3.2 oz (55.4 kg)   03/17/18 125 lb (56.7 kg)       Smoking: cut back to a little less than 1/2 PPD    Reviewed PmHx, RxHx, FmHx, SocHx, AllgHx and updated in chart. Family History   Problem Relation Age of Onset    Hypertension Father     Diabetes Maternal Grandmother     Cancer Paternal Grandmother        Past Medical History:   Diagnosis Date    Asthma     Low back pain     Scoliosis 1989      Social History     Socioeconomic History    Marital status: LEGALLY      Spouse name: Not on file    Number of children: Not on file    Years of education: Not on file    Highest education level: Not on file   Tobacco Use    Smoking status: Current Every Day Smoker     Packs/day: 0.50    Smokeless tobacco: Never Used    Tobacco comment: 10 daily   Substance and Sexual Activity    Alcohol use: Yes     Comment: rarely    Drug use: No    Sexual activity: Yes     Partners: Female   Social History Narrative    Works at [employer]        3/2018: recently  from his wife. Has 3 children               Current Outpatient Medications   Medication Sig    naproxen (NAPROSYN) 500 mg tablet Take 1 Tab by mouth two (2) times daily as needed. WITH FOOD. FOR PAIN.  DO NOT TAKE WITH OTHER NSAIDS    albuterol (PROVENTIL HFA, VENTOLIN HFA, PROAIR HFA) 90 mcg/actuation inhaler Take 1 Puff by inhalation every six (6) hours as needed for Wheezing.  gabapentin (NEURONTIN) 300 mg capsule Take 1 Cap by mouth three (3) times daily. No current facility-administered medications for this visit. Review of Systems:   Constitutional:    Negative for fever and chills, negative diaphoresis. HEENT:              Negative for neck pain and stiffness. Eyes:                  Negative for visual disturbance, itching, redness or discharge. Respiratory:        Negative for cough and shortness of breath. Cardiovascular:  Negative for chest pain and palpitations. Gastrointestinal: Negative for nausea, vomiting, abdominal pain, diarrhea or constipation. Genitourinary:     Negative for dysuria and frequency. Musculoskeletal: chronic LBP  Skin:                    Negative for rash, masses or lesions. Neurological:       Negative for dizzyness, seizure, loss of consciousness, weakness and numbness. Objective:     Vitals:    01/14/19 1048   BP: 132/86   Pulse: 94   Resp: 16   Temp: 97.1 °F (36.2 °C)   TempSrc: Oral   SpO2: 96%   Weight: 133 lb 1.6 oz (60.4 kg)   Height: 5' 4\" (1.626 m)           Physical Examination: General appearance - alert, well appearing, and in no distress  Eyes - pupils equal and reactive, extraocular eye movements intact  Ears - bilateral TM's and external ear canals normal  Mouth - mucous membranes moist, pharynx normal without lesions  Neck - supple, no significant adenopathy  Chest - clear to auscultation, no wheezes, rales or rhonchi, symmetric air entry  Heart - normal rate, regular rhythm, normal S1, S2, no murmurs, rubs, clicks or gallops  Neurological - alert, oriented, normal speech, no focal findings or movement disorder noted  Extremities - no pedal edema noted      Assessment/ Plan:   Follow-up Disposition: Not on File    1.  Routine physical examination  Declines STI testing  - METABOLIC PANEL, COMPREHENSIVE  - LIPID PANEL  - CBC W/O DIFF    2. Mixed hyperlipidemia      3. Chronic right-sided low back pain with right-sided sciatica    - naproxen (NAPROSYN) 500 mg tablet; Take 1 Tab by mouth two (2) times daily as needed. WITH FOOD. FOR PAIN. DO NOT TAKE WITH OTHER NSAIDS  Dispense: 60 Tab; Refill: 1        I have discussed the diagnosis with the patient and the intended plan as seen in the above orders. The patient has received an after-visit summary and questions were answered concerning future plans. Pt conveyed understanding of plan. Medication Side Effects and Warnings were discussed with patient: yes  Patient Labs were reviewed: yes  Patient Past Records were reviewed:  yes    Luisa Talavera.  Brian Angelucci, NP

## 2019-01-23 LAB
ALBUMIN SERPL-MCNC: 5 G/DL (ref 3.5–5.5)
ALBUMIN/GLOB SERPL: 1.5 {RATIO} (ref 1.2–2.2)
ALP SERPL-CCNC: 71 IU/L (ref 39–117)
ALT SERPL-CCNC: 15 IU/L (ref 0–44)
AST SERPL-CCNC: 15 IU/L (ref 0–40)
BILIRUB SERPL-MCNC: 0.2 MG/DL (ref 0–1.2)
BUN SERPL-MCNC: 15 MG/DL (ref 6–20)
BUN/CREAT SERPL: 12 (ref 9–20)
CALCIUM SERPL-MCNC: 9.9 MG/DL (ref 8.7–10.2)
CHLORIDE SERPL-SCNC: 105 MMOL/L (ref 96–106)
CHOLEST SERPL-MCNC: 225 MG/DL (ref 100–199)
CO2 SERPL-SCNC: 24 MMOL/L (ref 20–29)
CREAT SERPL-MCNC: 1.25 MG/DL (ref 0.76–1.27)
ERYTHROCYTE [DISTWIDTH] IN BLOOD BY AUTOMATED COUNT: 16.8 % (ref 12.3–15.4)
GLOBULIN SER CALC-MCNC: 3.3 G/DL (ref 1.5–4.5)
GLUCOSE SERPL-MCNC: 114 MG/DL (ref 65–99)
HCT VFR BLD AUTO: 40.3 % (ref 37.5–51)
HDLC SERPL-MCNC: 79 MG/DL
HGB BLD-MCNC: 12.9 G/DL (ref 13–17.7)
INTERPRETATION, 910389: NORMAL
LDLC SERPL CALC-MCNC: 131 MG/DL (ref 0–99)
MCH RBC QN AUTO: 21.9 PG (ref 26.6–33)
MCHC RBC AUTO-ENTMCNC: 32 G/DL (ref 31.5–35.7)
MCV RBC AUTO: 69 FL (ref 79–97)
PLATELET # BLD AUTO: 363 X10E3/UL (ref 150–379)
POTASSIUM SERPL-SCNC: 4 MMOL/L (ref 3.5–5.2)
PROT SERPL-MCNC: 8.3 G/DL (ref 6–8.5)
RBC # BLD AUTO: 5.88 X10E6/UL (ref 4.14–5.8)
SODIUM SERPL-SCNC: 145 MMOL/L (ref 134–144)
TRIGL SERPL-MCNC: 77 MG/DL (ref 0–149)
VLDLC SERPL CALC-MCNC: 15 MG/DL (ref 5–40)
WBC # BLD AUTO: 4.6 X10E3/UL (ref 3.4–10.8)

## 2019-05-10 ENCOUNTER — OFFICE VISIT (OUTPATIENT)
Dept: INTERNAL MEDICINE CLINIC | Age: 39
End: 2019-05-10

## 2019-05-10 VITALS
HEIGHT: 64 IN | BODY MASS INDEX: 21.36 KG/M2 | HEART RATE: 83 BPM | WEIGHT: 125.1 LBS | DIASTOLIC BLOOD PRESSURE: 80 MMHG | RESPIRATION RATE: 18 BRPM | TEMPERATURE: 96.8 F | SYSTOLIC BLOOD PRESSURE: 125 MMHG | OXYGEN SATURATION: 100 %

## 2019-05-10 DIAGNOSIS — G89.29 CHRONIC RIGHT-SIDED LOW BACK PAIN WITH RIGHT-SIDED SCIATICA: ICD-10-CM

## 2019-05-10 DIAGNOSIS — M54.41 CHRONIC RIGHT-SIDED LOW BACK PAIN WITH RIGHT-SIDED SCIATICA: ICD-10-CM

## 2019-05-10 DIAGNOSIS — J45.909 ASTHMA, UNSPECIFIED ASTHMA SEVERITY, UNSPECIFIED WHETHER COMPLICATED, UNSPECIFIED WHETHER PERSISTENT: ICD-10-CM
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
## 2019-05-10 DIAGNOSIS — M25.512 ACUTE PAIN OF LEFT SHOULDER: Primary | ICD-10-CM

## 2019-05-10 RX ORDER — GABAPENTIN 300 MG/1
300 CAPSULE ORAL 3 TIMES DAILY
Qty: 270 CAP | Refills: 0 | Status: SHIPPED | OUTPATIENT
Start: 2019-05-10 | End: 2019-10-11 | Stop reason: SDUPTHER

## 2019-05-10 RX ORDER — SUMATRIPTAN 100 MG/1
TABLET, FILM COATED ORAL
Refills: 0 | COMMUNITY
Start: 2019-04-03 | End: 2021-12-21

## 2019-05-10 RX ORDER — NAPROXEN 500 MG/1
500 TABLET ORAL
Qty: 60 TAB | Refills: 1 | Status: SHIPPED | OUTPATIENT
Start: 2019-05-10 | End: 2019-12-09 | Stop reason: SDUPTHER

## 2019-05-10 RX ORDER — ALBUTEROL SULFATE 90 UG/1
1 AEROSOL, METERED RESPIRATORY (INHALATION)
Qty: 1 INHALER | Refills: 1 | OUTPATIENT
Start: 2019-05-10 | End: 2021-12-21

## 2019-05-10 NOTE — PROGRESS NOTES
Pt here for   Chief Complaint   Patient presents with    Medication Refill    Shoulder Pain     Left shoulder     1. Have you been to the ER, urgent care clinic since your last visit? Hospitalized since your last visit? No    2. Have you seen or consulted any other health care providers outside of the [address] [place] [address] since your last visit? Include any pap smears or colon screening.  No         Pt c/o pain 3 of 10 , Pt denies taking anything for pain today        3 most recent PHQ Screens 5/10/2019   Little interest or pleasure in doing things Not at all   Feeling down, depressed, irritable, or hopeless Not at all   Total Score PHQ 2 0

## 2019-05-10 NOTE — PROGRESS NOTES
Subjective: (As above and below)     Chief Complaint   Patient presents with    Medication Refill    Shoulder Pain     Left shoulder     Laura Evans is a 46y.o. year old male who presents for left shoulder pain following injury. Last week he slipped down the stairs in his home and landed on his left upper back. He developed left shoulder pain - was unable to raise it past 90 degrees. No hand weakness/swelling. He states that he has been using otc meds, ice/heat. He has slight improvement but not resolution. He does a lot of heavy lifting at his job - works at [employer] feels that the lifting has aggravated it. Reviewed PmHx, RxHx, FmHx, SocHx, AllgHx and updated in chart. Family History   Problem Relation Age of Onset    Hypertension Father     Diabetes Maternal Grandmother     Cancer Paternal Grandmother        Past Medical History:   Diagnosis Date    Asthma     Low back pain     Scoliosis 1989      Social History     Socioeconomic History    Marital status: LEGALLY      Spouse name: Not on file    Number of children: Not on file    Years of education: Not on file    Highest education level: Not on file   Tobacco Use    Smoking status: Current Every Day Smoker     Packs/day: 0.50    Smokeless tobacco: Never Used    Tobacco comment: 10 daily   Substance and Sexual Activity    Alcohol use: Yes     Comment: rarely    Drug use: No    Sexual activity: Yes     Partners: Female   Social History Narrative    Works at [employer]        3/2018: recently  from his wife. Has 3 children               Current Outpatient Medications   Medication Sig    SUMAtriptan (IMITREX) 100 mg tablet TAKE 1 TAB BY MOUTH ONCE AS NEEDED FOR MIGRAINE FOR UP TO 1 DOSE.  naproxen (NAPROSYN) 500 mg tablet Take 1 Tab by mouth two (2) times daily as needed for Pain. WITH FOOD. FOR PAIN.  DO NOT TAKE WITH OTHER NSAIDS    albuterol (PROVENTIL HFA, VENTOLIN HFA, PROAIR HFA) 90 mcg/actuation inhaler Take 1 Puff by inhalation every six (6) hours as needed for Wheezing.  gabapentin (NEURONTIN) 300 mg capsule Take 1 Cap by mouth three (3) times daily. No current facility-administered medications for this visit. Review of Systems:   Constitutional:    Negative for fever and chills, negative diaphoresis. HEENT:              Negative for neck pain and stiffness. Eyes:                  Negative for visual disturbance, itching, redness or discharge. Respiratory:        Negative for cough and shortness of breath. Cardiovascular:  Negative for chest pain and palpitations. Gastrointestinal: Negative for nausea, vomiting, abdominal pain, diarrhea or constipation. Genitourinary:     Negative for dysuria and frequency. Musculoskeletal: left shoulder pain  Skin:                    Negative for rash, masses or lesions. Neurological:       Negative for dizzyness, seizure, loss of consciousness, weakness and numbness. Objective:     Vitals:    05/10/19 0902   BP: 125/80   Pulse: 83   Resp: 18   Temp: 96.8 °F (36 °C)   TempSrc: Oral   SpO2: 100%   Weight: 125 lb 1.6 oz (56.7 kg)   Height: 5' 4\" (1.626 m)           Physical Examination: General appearance - alert, well appearing, and in no distress  Chest - clear to auscultation, no wheezes, rales or rhonchi, symmetric air entry  Heart - normal rate, regular rhythm, normal S1, S2, no murmurs, rubs, clicks or gallops  Musculoskeletal - ttp to posterior aspect of shoulder.  strength 5/5. Able to raise arm fully w/ some pain. Neg drop arm test    Assessment/ Plan:       1. Chronic right-sided low back pain with right-sided sciatica  Refill, back is at baseline  - naproxen (NAPROSYN) 500 mg tablet; Take 1 Tab by mouth two (2) times daily as needed for Pain. WITH FOOD. FOR PAIN. DO NOT TAKE WITH OTHER NSAIDS  Dispense: 60 Tab; Refill: 1    2.  Asthma, unspecified asthma severity, unspecified whether complicated, unspecified whether persistent  Infrequent use  - albuterol (PROVENTIL HFA, VENTOLIN HFA, PROAIR HFA) 90 mcg/actuation inhaler; Take 1 Puff by inhalation every six (6) hours as needed for Wheezing. Dispense: 1 Inhaler; Refill: 1    3. Acute pain of left shoulder      4. Fall, initial encounter  Leave from work to rest, avoid heavy lifting, f/u [facility]      I have discussed the diagnosis with the patient and the intended plan as seen in the above orders. The patient has received an after-visit summary and questions were answered concerning future plans. Pt conveyed understanding of plan. Medication Side Effects and Warnings were discussed with patient: yes  Patient Labs were reviewed: yes  Patient Past Records were reviewed:  yes    Helio Loyd.  Fausto Reilly NP

## 2019-05-10 NOTE — LETTER
NOTIFICATION RETURN TO WORK / SCHOOL 
 
5/10/2019 9:20 AM 
 
Mr. Brenden Tanner [address] [place] [phone] To Whom It May Concern: 
 
Brenden Tanner is currently under the care of Analia Sorensen. He will return to work/school on: 5/20/19 If there are questions or concerns please have the patient contact our office. Sincerely, Denisse Reyna NP

## 2019-10-11 DIAGNOSIS — G89.29 CHRONIC LOW BACK PAIN WITH SCIATICA, SCIATICA LATERALITY UNSPECIFIED, UNSPECIFIED BACK PAIN LATERALITY: Primary | ICD-10-CM

## 2019-10-11 DIAGNOSIS — M54.40 CHRONIC LOW BACK PAIN WITH SCIATICA, SCIATICA LATERALITY UNSPECIFIED, UNSPECIFIED BACK PAIN LATERALITY: Primary | ICD-10-CM

## 2019-10-11 RX ORDER — GABAPENTIN 300 MG/1
300 CAPSULE ORAL 3 TIMES DAILY
Qty: 270 CAP | Refills: 0 | Status: SHIPPED | OUTPATIENT
Start: 2019-10-11 | End: 2020-06-22 | Stop reason: SDUPTHER

## 2019-12-09 ENCOUNTER — OFFICE VISIT (OUTPATIENT)
Dept: INTERNAL MEDICINE CLINIC | Age: 39
End: 2019-12-09

## 2019-12-09 VITALS
SYSTOLIC BLOOD PRESSURE: 117 MMHG | BODY MASS INDEX: 23.06 KG/M2 | HEART RATE: 85 BPM | DIASTOLIC BLOOD PRESSURE: 75 MMHG | OXYGEN SATURATION: 98 % | TEMPERATURE: 98.5 F | WEIGHT: 135.1 LBS | HEIGHT: 64 IN | RESPIRATION RATE: 18 BRPM

## 2019-12-09 DIAGNOSIS — Z23 ENCOUNTER FOR IMMUNIZATION: Primary | ICD-10-CM

## 2019-12-09 DIAGNOSIS — M54.2 NECK PAIN: ICD-10-CM

## 2019-12-09 RX ORDER — NAPROXEN 500 MG/1
500 TABLET ORAL
Qty: 60 TAB | Refills: 1 | Status: SHIPPED | OUTPATIENT
Start: 2019-12-09 | End: 2020-06-22 | Stop reason: SDUPTHER

## 2019-12-09 RX ORDER — CYCLOBENZAPRINE HCL 10 MG
TABLET ORAL
Refills: 0 | COMMUNITY
Start: 2019-10-02 | End: 2020-06-22 | Stop reason: ALTCHOICE

## 2019-12-09 RX ORDER — KETOROLAC TROMETHAMINE 10 MG/1
TABLET, FILM COATED ORAL
Refills: 0 | COMMUNITY
Start: 2019-10-02 | End: 2019-12-09

## 2019-12-09 RX ORDER — METHOCARBAMOL 500 MG/1
500 TABLET, FILM COATED ORAL
Qty: 20 TAB | Refills: 0 | Status: SHIPPED | OUTPATIENT
Start: 2019-12-09 | End: 2020-06-22 | Stop reason: SDUPTHER

## 2019-12-09 RX ORDER — TRAMADOL HYDROCHLORIDE 50 MG/1
50 TABLET ORAL
Qty: 9 TAB | Refills: 0 | Status: SHIPPED | OUTPATIENT
Start: 2019-12-09 | End: 2019-12-12

## 2019-12-09 NOTE — PATIENT INSTRUCTIONS
1. Heat 15 minutes 4 x per day if able, muscle relaxant robaxin (may make you sleepy - do NOT take with flexeril). Topical pain cream, slow exercises below. Tramadol for SEVERE pain only - not to exceed 3-4 days use    2. If pain does not start to ease up, will order xray     3. If weakness/tingling to arms let me know           Neck: Exercises  Introduction  Here are some examples of exercises for you to try. The exercises may be suggested for a condition or for rehabilitation. Start each exercise slowly. Ease off the exercises if you start to have pain. You will be told when to start these exercises and which ones will work best for you. How to do the exercises  Neck stretch    1. This stretch works best if you keep your shoulder down as you lean away from it. To help you remember to do this, start by relaxing your shoulders and lightly holding on to your thighs or your chair. 2. Tilt your head toward your shoulder and hold for 15 to 30 seconds. Let the weight of your head stretch your muscles. 3. If you would like a little added stretch, use your hand to gently and steadily pull your head toward your shoulder. For example, keeping your right shoulder down, lean your head to the left. 4. Repeat 2 to 4 times toward each shoulder. Diagonal neck stretch    1. Turn your head slightly toward the direction you will be stretching, and tilt your head diagonally toward your chest and hold for 15 to 30 seconds. 2. If you would like a little added stretch, use your hand to gently and steadily pull your head forward on the diagonal.  3. Repeat 2 to 4 times toward each side. Dorsal glide stretch    1. Sit or stand tall and look straight ahead. 2. Slowly tuck your chin as you glide your head backward over your body  3. Hold for a count of 6, and then relax for up to 10 seconds. 4. Repeat 8 to 12 times. Chest and shoulder stretch    1.  Sit or stand tall and glide your head backward as in the dorsal glide stretch. 2. Raise both arms so that your hands are next to your ears. 3. Take a deep breath, and as you breathe out, lower your elbows down and behind your back. You will feel your shoulder blades slide down and together, and at the same time you will feel a stretch across your chest and the front of your shoulders. 4. Hold for about 6 seconds, and then relax for up to 10 seconds. 5. Repeat 8 to 12 times. Strengthening: Hands on head    1. Move your head backward, forward, and side to side against gentle pressure from your hands, holding each position for about 6 seconds. 2. Repeat 8 to 12 times. Follow-up care is a key part of your treatment and safety. Be sure to make and go to all appointments, and call your doctor if you are having problems. It's also a good idea to know your test results and keep a list of the medicines you take. Where can you learn more? Go to http://evaristo-sylvester.info/. Enter P975 in the search box to learn more about \"Neck: Exercises. \"  Current as of: June 26, 2019  Content Version: 12.2  © 6542-2180 [employer]. Care instructions adapted under license by [employer] (which disclaims liability or warranty for this information). If you have questions about a medical condition or this instruction, always ask your healthcare professional. [address] any warranty or liability for your use of this information.

## 2019-12-09 NOTE — PROGRESS NOTES
Pt here for   Chief Complaint   Patient presents with    Neck Pain     1. Have you been to the ER, urgent care clinic since your last visit? Hospitalized since your last visit? No    2. Have you seen or consulted any other health care providers outside of the [address] [place] [address] since your last visit? Include any pap smears or colon screening.  No     Pt 5 of 10, Pt took Motrin this am for pain today      3 most recent PHQ Screens 12/9/2019   Little interest or pleasure in doing things Not at all   Feeling down, depressed, irritable, or hopeless Not at all   Total Score PHQ 2 0

## 2019-12-09 NOTE — LETTER
NOTIFICATION RETURN TO WORK / SCHOOL 
 
12/9/2019 1:09 PM 
 
Mr. Mark Jaime [address] [facility] [address] To Whom It May Concern: 
 
Mark Jaime is currently under the care of Analia Sorensen. He will return to work/school on: 12/11/19. If there are questions or concerns please have the patient contact our office. Sincerely, Denisse James NP

## 2019-12-09 NOTE — PROGRESS NOTES
Subjective: (As above and below)     Chief Complaint   Patient presents with    Neck Pain     Klever Perrin is a 46y.o. year old male who presents for neck pain    Neck pain: since Friday, worsening. pain to midline neck, pain worse w/ head turning  Heat, flexeril, motrin didn't take edge off. He denies any inciting injury. No hx of neck problems. Hx of low back pain which is stable  No hand weakness/numbness        Reviewed PmHx, RxHx, FmHx, SocHx, AllgHx and updated in chart. Family History   Problem Relation Age of Onset    Hypertension Father     Diabetes Maternal Grandmother     Cancer Paternal Grandmother        Past Medical History:   Diagnosis Date    Asthma     Low back pain     Scoliosis 1989      Social History     Socioeconomic History    Marital status: LEGALLY      Spouse name: Not on file    Number of children: Not on file    Years of education: Not on file    Highest education level: Not on file   Tobacco Use    Smoking status: Current Every Day Smoker     Packs/day: 0.50    Smokeless tobacco: Never Used    Tobacco comment: 10 daily   Substance and Sexual Activity    Alcohol use: Yes     Comment: rarely    Drug use: No    Sexual activity: Yes     Partners: Female   Social History Narrative    Works at [employer]        3/2018: recently  from his wife. Has 3 children               Current Outpatient Medications   Medication Sig    cyclobenzaprine (FLEXERIL) 10 mg tablet TAKE 1 TABLET BY MOUTH EVERY 8 HOURS AS NEEDED FOR MUSCLE SPASM    ketorolac (TORADOL) 10 mg tablet TAKE 1 TABLET BY MOUTH EVERY 6 TO 8 HOURS AS NEEDED FOR PAIN    gabapentin (NEURONTIN) 300 mg capsule Take 1 Cap by mouth three (3) times daily.  SUMAtriptan (IMITREX) 100 mg tablet TAKE 1 TAB BY MOUTH ONCE AS NEEDED FOR MIGRAINE FOR UP TO 1 DOSE.  naproxen (NAPROSYN) 500 mg tablet Take 1 Tab by mouth two (2) times daily as needed for Pain. WITH FOOD. FOR PAIN.  DO NOT TAKE WITH OTHER NSAIDS    albuterol (PROVENTIL HFA, VENTOLIN HFA, PROAIR HFA) 90 mcg/actuation inhaler Take 1 Puff by inhalation every six (6) hours as needed for Wheezing. No current facility-administered medications for this visit. Review of Systems:   Constitutional:    Negative for fever and chills, negative diaphoresis. HEENT:              Negative for neck pain and stiffness. Eyes:                  Negative for visual disturbance, itching, redness or discharge. Respiratory:        Negative for cough and shortness of breath. Cardiovascular:  Negative for chest pain and palpitations. Gastrointestinal: Negative for nausea, vomiting, abdominal pain, diarrhea or constipation. Genitourinary:     Negative for dysuria and frequency. Musculoskeletal: Negative for falls, tenderness and swelling. Skin:                    Negative for rash, masses or lesions. Neurological:       Negative for dizzyness, seizure, loss of consciousness, weakness and numbness. Objective:     Vitals:    12/09/19 1253   BP: 117/75   Pulse: 85   Resp: 18   Temp: 98.5 °F (36.9 °C)   TempSrc: Oral   SpO2: 98%   Weight: 135 lb 1.6 oz (61.3 kg)   Height: 5' 4\" (1.626 m)       Results for orders placed or performed in visit on 22/90/55   METABOLIC PANEL, COMPREHENSIVE   Result Value Ref Range    Glucose 114 (H) 65 - 99 mg/dL    BUN 15 6 - 20 mg/dL    Creatinine 1.25 0.76 - 1.27 mg/dL    GFR est non-AA 73 >59 mL/min/1.73    GFR est AA 84 >59 mL/min/1.73    BUN/Creatinine ratio 12 9 - 20    Sodium 145 (H) 134 - 144 mmol/L    Potassium 4.0 3.5 - 5.2 mmol/L    Chloride 105 96 - 106 mmol/L    CO2 24 20 - 29 mmol/L    Calcium 9.9 8.7 - 10.2 mg/dL    Protein, total 8.3 6.0 - 8.5 g/dL    Albumin 5.0 3.5 - 5.5 g/dL    GLOBULIN, TOTAL 3.3 1.5 - 4.5 g/dL    A-G Ratio 1.5 1.2 - 2.2    Bilirubin, total 0.2 0.0 - 1.2 mg/dL    Alk.  phosphatase 71 39 - 117 IU/L    AST (SGOT) 15 0 - 40 IU/L    ALT (SGPT) 15 0 - 44 IU/L   LIPID PANEL   Result Value Ref Range    Cholesterol, total 225 (H) 100 - 199 mg/dL    Triglyceride 77 0 - 149 mg/dL    HDL Cholesterol 79 >39 mg/dL    VLDL, calculated 15 5 - 40 mg/dL    LDL, calculated 131 (H) 0 - 99 mg/dL   CBC W/O DIFF   Result Value Ref Range    WBC 4.6 3.4 - 10.8 x10E3/uL    RBC 5.88 (H) 4.14 - 5.80 x10E6/uL    HGB 12.9 (L) 13.0 - 17.7 g/dL    HCT 40.3 37.5 - 51.0 %    MCV 69 (L) 79 - 97 fL    MCH 21.9 (L) 26.6 - 33.0 pg    MCHC 32.0 31.5 - 35.7 g/dL    RDW 16.8 (H) 12.3 - 15.4 %    PLATELET 395 075 - 348 x10E3/uL   CVD REPORT   Result Value Ref Range    INTERPRETATION Note          Physical Examination: General appearance - alert, well appearing, and in no distress  Chest - clear to auscultation, no wheezes, rales or rhonchi, symmetric air entry  Heart - normal rate, regular rhythm, normal S1, S2, no murmurs, rubs, clicks or gallops  Back exam - TTP to c spine and mildly to associated paraspinals pain with head flex/ext.  strength 5/5 bilat, arms full ROM  Gait nl  Neurological - alert, oriented, normal speech, no focal findings or movement disorder noted      Assessment/ Plan:      1. Encounter for immunization    - INFLUENZA VIRUS VAC QUAD,SPLIT,PRESV FREE SYRINGE IM    2. Neck pain  Reserve tramadol for severe pain, no robaxin w/ flexeril  Heat/topicals, exercises  recc  F/u [facility]  - naproxen (NAPROSYN) 500 mg tablet; Take 1 Tab by mouth two (2) times daily as needed for Pain. WITH FOOD. FOR PAIN. DO NOT TAKE WITH OTHER NSAIDS  Dispense: 60 Tab; Refill: 1  - methocarbamol (ROBAXIN) 500 mg tablet; Take 1 Tab by mouth three (3) times daily as needed for Pain. Not with flexeril  Dispense: 20 Tab; Refill: 0  - traMADol (ULTRAM) 50 mg tablet; Take 1 Tab by mouth every eight (8) hours as needed for Pain for up to 3 days. Max Daily Amount: 150 mg. Dispense: 9 Tab; Refill: 0        I have discussed the diagnosis with the patient and the intended plan as seen in the above orders.   The patient has received an after-visit summary and questions were answered concerning future plans. Pt conveyed understanding of plan. Medication Side Effects and Warnings were discussed with patient: yes  Patient Labs were reviewed: yes  Patient Past Records were reviewed:  yes    Sandra Ramirez.  Ramana Meehan NP

## 2019-12-16 ENCOUNTER — HOSPITAL ENCOUNTER (OUTPATIENT)
Dept: GENERAL RADIOLOGY | Age: 39
Discharge: HOME | End: 2019-12-16
Payer: COMMERCIAL

## 2019-12-16 ENCOUNTER — OFFICE VISIT (OUTPATIENT)
Dept: INTERNAL MEDICINE CLINIC | Age: 39
End: 2019-12-16

## 2019-12-16 VITALS
DIASTOLIC BLOOD PRESSURE: 85 MMHG | WEIGHT: 135.1 LBS | HEIGHT: 64 IN | SYSTOLIC BLOOD PRESSURE: 129 MMHG | TEMPERATURE: 97.6 F | HEART RATE: 77 BPM | RESPIRATION RATE: 18 BRPM | BODY MASS INDEX: 23.06 KG/M2 | OXYGEN SATURATION: 100 %

## 2019-12-16 DIAGNOSIS — M54.12 CERVICAL RADICULOPATHY: Primary | ICD-10-CM

## 2019-12-16 DIAGNOSIS — M54.12 CERVICAL RADICULOPATHY: ICD-10-CM

## 2019-12-16 PROCEDURE — 72050 X-RAY EXAM NECK SPINE 4/5VWS: CPT

## 2019-12-16 RX ORDER — PREDNISONE 5 MG/1
TABLET ORAL
Qty: 21 TAB | Refills: 0 | Status: SHIPPED | OUTPATIENT
Start: 2019-12-16 | End: 2020-09-30 | Stop reason: ALTCHOICE

## 2019-12-16 NOTE — PROGRESS NOTES
Subjective: (As above and below)     Chief Complaint   Patient presents with    Neck Pain     Pt states neck pain migrrates to his shoulders    Shoulder Pain     Dion Gomez is a 46y.o. year old male who presents for neck pain - he was here only 1 week ago - he states that while he can turn his head from side to side w/ more ease the pain is now radiating to bilateral shoulders. No hand weakness/neuropathy      Reviewed PmHx, RxHx, FmHx, SocHx, AllgHx and updated in chart. Family History   Problem Relation Age of Onset    Hypertension Father     Diabetes Maternal Grandmother     Cancer Paternal Grandmother        Past Medical History:   Diagnosis Date    Asthma     Low back pain     Scoliosis 1989      Social History     Socioeconomic History    Marital status: LEGALLY      Spouse name: Not on file    Number of children: Not on file    Years of education: Not on file    Highest education level: Not on file   Tobacco Use    Smoking status: Current Every Day Smoker     Packs/day: 0.50    Smokeless tobacco: Never Used    Tobacco comment: 10 daily   Substance and Sexual Activity    Alcohol use: Yes     Comment: rarely    Drug use: No    Sexual activity: Yes     Partners: Female   Social History Narrative    Works at [employer]        3/2018: recently  from his wife. Has 3 children               Current Outpatient Medications   Medication Sig    predniSONE (STERAPRED) 5 mg dose pack See administration instruction per 5mg dose pack    cyclobenzaprine (FLEXERIL) 10 mg tablet TAKE 1 TABLET BY MOUTH EVERY 8 HOURS AS NEEDED FOR MUSCLE SPASM    naproxen (NAPROSYN) 500 mg tablet Take 1 Tab by mouth two (2) times daily as needed for Pain. WITH FOOD. FOR PAIN. DO NOT TAKE WITH OTHER NSAIDS    methocarbamol (ROBAXIN) 500 mg tablet Take 1 Tab by mouth three (3) times daily as needed for Pain.  Not with flexeril    gabapentin (NEURONTIN) 300 mg capsule Take 1 Cap by mouth three (3) times daily.  SUMAtriptan (IMITREX) 100 mg tablet TAKE 1 TAB BY MOUTH ONCE AS NEEDED FOR MIGRAINE FOR UP TO 1 DOSE.  albuterol (PROVENTIL HFA, VENTOLIN HFA, PROAIR HFA) 90 mcg/actuation inhaler Take 1 Puff by inhalation every six (6) hours as needed for Wheezing. No current facility-administered medications for this visit. Review of Systems:   Constitutional:    Negative for fever and chills, negative diaphoresis. HEENT:              Negative for neck pain and stiffness. Eyes:                  Negative for visual disturbance, itching, redness or discharge. Respiratory:        Negative for cough and shortness of breath. Cardiovascular:  Negative for chest pain and palpitations. Gastrointestinal: Negative for nausea, vomiting, abdominal pain, diarrhea or constipation. Genitourinary:     Negative for dysuria and frequency. Musculoskeletal: Negative for falls, tenderness and swelling. Skin:                    Negative for rash, masses or lesions. Neurological:       Negative for dizzyness, seizure, loss of consciousness, weakness and numbness.      Objective:     Vitals:    12/16/19 1509   BP: 129/85   Pulse: 77   Resp: 18   Temp: 97.6 °F (36.4 °C)   TempSrc: Oral   SpO2: 100%   Weight: 135 lb 1.6 oz (61.3 kg)   Height: 5' 4\" (1.626 m)       Results for orders placed or performed in visit on 18/89/72   METABOLIC PANEL, COMPREHENSIVE   Result Value Ref Range    Glucose 114 (H) 65 - 99 mg/dL    BUN 15 6 - 20 mg/dL    Creatinine 1.25 0.76 - 1.27 mg/dL    GFR est non-AA 73 >59 mL/min/1.73    GFR est AA 84 >59 mL/min/1.73    BUN/Creatinine ratio 12 9 - 20    Sodium 145 (H) 134 - 144 mmol/L    Potassium 4.0 3.5 - 5.2 mmol/L    Chloride 105 96 - 106 mmol/L    CO2 24 20 - 29 mmol/L    Calcium 9.9 8.7 - 10.2 mg/dL    Protein, total 8.3 6.0 - 8.5 g/dL    Albumin 5.0 3.5 - 5.5 g/dL    GLOBULIN, TOTAL 3.3 1.5 - 4.5 g/dL    A-G Ratio 1.5 1.2 - 2.2    Bilirubin, total 0.2 0.0 - 1.2 mg/dL Alk. phosphatase 71 39 - 117 IU/L    AST (SGOT) 15 0 - 40 IU/L    ALT (SGPT) 15 0 - 44 IU/L   LIPID PANEL   Result Value Ref Range    Cholesterol, total 225 (H) 100 - 199 mg/dL    Triglyceride 77 0 - 149 mg/dL    HDL Cholesterol 79 >39 mg/dL    VLDL, calculated 15 5 - 40 mg/dL    LDL, calculated 131 (H) 0 - 99 mg/dL   CBC W/O DIFF   Result Value Ref Range    WBC 4.6 3.4 - 10.8 x10E3/uL    RBC 5.88 (H) 4.14 - 5.80 x10E6/uL    HGB 12.9 (L) 13.0 - 17.7 g/dL    HCT 40.3 37.5 - 51.0 %    MCV 69 (L) 79 - 97 fL    MCH 21.9 (L) 26.6 - 33.0 pg    MCHC 32.0 31.5 - 35.7 g/dL    RDW 16.8 (H) 12.3 - 15.4 %    PLATELET 115 119 - 786 x10E3/uL   CVD REPORT   Result Value Ref Range    INTERPRETATION Note          Physical Examination: General appearance - alert, well appearing, and in no distress  Chest - clear to auscultation, no wheezes, rales or rhonchi, symmetric air entry  Heart - normal rate, regular rhythm, normal S1, S2, no murmurs, rubs, clicks or gallops    Neck: ttp to c spine and associated paraspinals    Assessment/ Plan:      1. Cervical radiculopathy  Call [facility]  - predniSONE (STERAPRED) 5 mg dose pack; See administration instruction per 5mg dose pack  Dispense: 21 Tab; Refill: 0  - XR SPINE CERV 4 OR 5 V; Future      I have discussed the diagnosis with the patient and the intended plan as seen in the above orders. The patient has received an after-visit summary and questions were answered concerning future plans. Pt conveyed understanding of plan. Medication Side Effects and Warnings were discussed with patient: yes  Patient Labs were reviewed: yes  Patient Past Records were reviewed:  yes    Lesa Verdin.  Mare Gamboa NP

## 2019-12-16 NOTE — LETTER
NOTIFICATION RETURN TO WORK / SCHOOL 
 
12/16/2019 3:17 PM 
 
Mr. Jason Hudson [facility] [id number] To Whom It May Concern: 
 
Jason Hudson is currently under the care of Analia N Shaunna Sorensen. He will return to work/school on: 12/17/19 If there are questions or concerns please have the patient contact our office. Sincerely, Denisse Harris NP

## 2019-12-16 NOTE — PROGRESS NOTES
Pt here for   Chief Complaint   Patient presents with    Neck Pain     Pt states neck pain migrrates to his shoulders    Shoulder Pain     1. Have you been to the ER, urgent care clinic since your last visit? Hospitalized since your last visit? No    2. Have you seen or consulted any other health care providers outside of the [address] [place] [address] since your last visit? Include any pap smears or colon screening.  No       Pt c/o pain 4 of 10, Pt took Naprosyn for pain today    3 most recent PHQ Screens 12/16/2019   Little interest or pleasure in doing things Not at all   Feeling down, depressed, irritable, or hopeless Not at all   Total Score PHQ 2 0

## 2019-12-16 NOTE — PATIENT INSTRUCTIONS

## 2020-06-22 ENCOUNTER — OFFICE VISIT (OUTPATIENT)
Dept: INTERNAL MEDICINE CLINIC | Age: 40
End: 2020-06-22

## 2020-06-22 VITALS
DIASTOLIC BLOOD PRESSURE: 90 MMHG | RESPIRATION RATE: 18 BRPM | OXYGEN SATURATION: 100 % | HEART RATE: 78 BPM | BODY MASS INDEX: 21.68 KG/M2 | HEIGHT: 64 IN | WEIGHT: 127 LBS | SYSTOLIC BLOOD PRESSURE: 139 MMHG | TEMPERATURE: 98.2 F

## 2020-06-22 DIAGNOSIS — M54.40 CHRONIC LOW BACK PAIN WITH SCIATICA, SCIATICA LATERALITY UNSPECIFIED, UNSPECIFIED BACK PAIN LATERALITY: ICD-10-CM

## 2020-06-22 DIAGNOSIS — G89.29 CHRONIC LOW BACK PAIN WITH SCIATICA, SCIATICA LATERALITY UNSPECIFIED, UNSPECIFIED BACK PAIN LATERALITY: ICD-10-CM

## 2020-06-22 RX ORDER — ACETAMINOPHEN AND CODEINE PHOSPHATE 300; 30 MG/1; MG/1
1 TABLET ORAL
COMMUNITY
End: 2020-09-30 | Stop reason: ALTCHOICE

## 2020-06-22 RX ORDER — NAPROXEN 500 MG/1
500 TABLET ORAL
Qty: 60 TAB | Refills: 1 | Status: SHIPPED | OUTPATIENT
Start: 2020-06-22 | End: 2020-09-30 | Stop reason: SDUPTHER

## 2020-06-22 RX ORDER — METHOCARBAMOL 500 MG/1
500 TABLET, FILM COATED ORAL
Qty: 30 TAB | Refills: 0 | Status: SHIPPED | OUTPATIENT
Start: 2020-06-22 | End: 2020-09-30 | Stop reason: SDUPTHER

## 2020-06-22 RX ORDER — GABAPENTIN 300 MG/1
300 CAPSULE ORAL 3 TIMES DAILY
Qty: 270 CAP | Refills: 0 | Status: SHIPPED | OUTPATIENT
Start: 2020-06-22 | End: 2020-09-30

## 2020-06-22 RX ORDER — IBUPROFEN 800 MG/1
TABLET ORAL
COMMUNITY
End: 2021-12-21

## 2020-06-22 NOTE — PROGRESS NOTES
Chief Complaint   Patient presents with    Back Pain     mid back; pt reports pain gives sensation of having to go to the bathroom and \"popping\" feeling when walking        1. Have you been to the ER, urgent care clinic since your last visit? Hospitalized since your last visit? Yes When: 06/2020 Where: [facility] Reason for visit: back pain     2. Have you seen or consulted any other health care providers outside of the [address] [place] [address] since your last visit? Include any pap smears or colon screening.  No

## 2020-06-22 NOTE — PROGRESS NOTES
Subjective: (As above and below)     Chief Complaint   Patient presents with    Back Pain     mid back; pt reports pain gives sensation of having to go to the bathroom and \"popping\" feeling when walking      David Nair is a 45y.o. year old male who presents for acute on chronic low back pain    The past few weeks his back pain has been flaring up and feels different that previous pain. He has a new job that is less physically demanding, no new injuries. Pain is lumbar radiating to R leg. He feels a \"popping\" sensation to low back w/ walking. He denies bowel/bladder incontinence but does feel bowel urgency - pressure to go and concern that he may not be able to hold BM. Roberto Confer No saddle numbness or falls  He has seen ortho, has done PT, joint injections. He has been out of gabapentin which helped a little in the past, he was [facility] - xray showed scolioisis only per patient  Last MRI several years ago - surgery was discussed at some point        Reviewed PmHx, RxHx, FmHx, SocHx, AllgHx and updated in chart. Family History   Problem Relation Age of Onset    Hypertension Father     Diabetes Maternal Grandmother     Cancer Paternal Grandmother        Past Medical History:   Diagnosis Date    Asthma     Low back pain     Scoliosis 1989      Social History     Socioeconomic History    Marital status: LEGALLY      Spouse name: Not on file    Number of children: Not on file    Years of education: Not on file    Highest education level: Not on file   Tobacco Use    Smoking status: Current Every Day Smoker     Packs/day: 0.50    Smokeless tobacco: Never Used    Tobacco comment: 10 daily   Substance and Sexual Activity    Alcohol use: Yes     Comment: rarely    Drug use: No    Sexual activity: Yes     Partners: Female   Social History Narrative    Works at [employer]        3/2018: recently  from his wife.  Has 3 children               Current Outpatient Medications   Medication Sig    ibuprofen (MOTRIN) 800 mg tablet Take  by mouth.  acetaminophen-codeine (Tylenol-Codeine #3) 300-30 mg per tablet Take 1 Tab by mouth every four (4) hours as needed for Pain.  cyclobenzaprine (FLEXERIL) 10 mg tablet TAKE 1 TABLET BY MOUTH EVERY 8 HOURS AS NEEDED FOR MUSCLE SPASM    SUMAtriptan (IMITREX) 100 mg tablet TAKE 1 TAB BY MOUTH ONCE AS NEEDED FOR MIGRAINE FOR UP TO 1 DOSE.  albuterol (PROVENTIL HFA, VENTOLIN HFA, PROAIR HFA) 90 mcg/actuation inhaler Take 1 Puff by inhalation every six (6) hours as needed for Wheezing.  predniSONE (STERAPRED) 5 mg dose pack See administration instruction per 5mg dose pack    naproxen (NAPROSYN) 500 mg tablet Take 1 Tab by mouth two (2) times daily as needed for Pain. WITH FOOD. FOR PAIN. DO NOT TAKE WITH OTHER NSAIDS    methocarbamol (ROBAXIN) 500 mg tablet Take 1 Tab by mouth three (3) times daily as needed for Pain. Not with flexeril    gabapentin (NEURONTIN) 300 mg capsule Take 1 Cap by mouth three (3) times daily. No current facility-administered medications for this visit. Review of Systems:   Constitutional:    Negative for fever and chills, negative diaphoresis. HEENT:              Negative for neck pain and stiffness. Eyes:                  Negative for visual disturbance, itching, redness or discharge. Respiratory:        Negative for cough and shortness of breath. Cardiovascular:  Negative for chest pain and palpitations. Gastrointestinal: Negative for nausea, vomiting, abdominal pain, diarrhea or constipation. Genitourinary:     Negative for dysuria and frequency. Musculoskeletal: Negative for falls, tenderness and swelling. Skin:                    LBP  Neurological:       Negative for dizzyness, seizure, loss of consciousness, weakness and numbness.      Objective:     Vitals:    06/22/20 1544   BP: 139/90   Pulse: 78   Resp: 18   Temp: 98.2 °F (36.8 °C)   TempSrc: Temporal SpO2: 100%   Weight: 127 lb (57.6 kg)   Height: 5' 4\" (1.626 m)       Results for orders placed or performed in visit on 14/27/19   METABOLIC PANEL, COMPREHENSIVE   Result Value Ref Range    Glucose 114 (H) 65 - 99 mg/dL    BUN 15 6 - 20 mg/dL    Creatinine 1.25 0.76 - 1.27 mg/dL    GFR est non-AA 73 >59 mL/min/1.73    GFR est AA 84 >59 mL/min/1.73    BUN/Creatinine ratio 12 9 - 20    Sodium 145 (H) 134 - 144 mmol/L    Potassium 4.0 3.5 - 5.2 mmol/L    Chloride 105 96 - 106 mmol/L    CO2 24 20 - 29 mmol/L    Calcium 9.9 8.7 - 10.2 mg/dL    Protein, total 8.3 6.0 - 8.5 g/dL    Albumin 5.0 3.5 - 5.5 g/dL    GLOBULIN, TOTAL 3.3 1.5 - 4.5 g/dL    A-G Ratio 1.5 1.2 - 2.2    Bilirubin, total 0.2 0.0 - 1.2 mg/dL    Alk. phosphatase 71 39 - 117 IU/L    AST (SGOT) 15 0 - 40 IU/L    ALT (SGPT) 15 0 - 44 IU/L   LIPID PANEL   Result Value Ref Range    Cholesterol, total 225 (H) 100 - 199 mg/dL    Triglyceride 77 0 - 149 mg/dL    HDL Cholesterol 79 >39 mg/dL    VLDL, calculated 15 5 - 40 mg/dL    LDL, calculated 131 (H) 0 - 99 mg/dL   CBC W/O DIFF   Result Value Ref Range    WBC 4.6 3.4 - 10.8 x10E3/uL    RBC 5.88 (H) 4.14 - 5.80 x10E6/uL    HGB 12.9 (L) 13.0 - 17.7 g/dL    HCT 40.3 37.5 - 51.0 %    MCV 69 (L) 79 - 97 fL    MCH 21.9 (L) 26.6 - 33.0 pg    MCHC 32.0 31.5 - 35.7 g/dL    RDW 16.8 (H) 12.3 - 15.4 %    PLATELET 281 711 - 840 x10E3/uL   CVD REPORT   Result Value Ref Range    INTERPRETATION Note          Physical Examination: General appearance - alert, well appearing, and in no distress  Chest - clear to auscultation, no wheezes, rales or rhonchi, symmetric air entry  Heart - normal rate, regular rhythm, normal S1, S2, no murmurs, rubs, clicks or gallops  Back exam -ttp to L spine and L paraspinals, pain to buttocks w/ L SLR  Gait abl - pt favoring weight to R leg    Assessment/ Plan:         1.  Chronic low back pain with sciatica, sciatica laterality unspecified, unspecified back pain laterality  Will order MRI as worsening s/s and some bowel urgency  - acetaminophen-codeine (Tylenol-Codeine #3) 300-30 mg per tablet; Take 1 Tab by mouth every four (4) hours as needed for Pain.  - naproxen (NAPROSYN) 500 mg tablet; Take 1 Tab by mouth two (2) times daily as needed for Pain. WITH FOOD. FOR PAIN. DO NOT TAKE WITH OTHER NSAIDS  Dispense: 60 Tab; Refill: 1  - methocarbamoL (ROBAXIN) 500 mg tablet; Take 1 Tab by mouth three (3) times daily as needed for Pain. Not with flexeril  Dispense: 30 Tab; Refill: 0  - gabapentin (NEURONTIN) 300 mg capsule; Take 1 Cap by mouth three (3) times daily. Dispense: 270 Cap; Refill: 0  - MRI LUMB SPINE WO CONT; Future        I have discussed the diagnosis with the patient and the intended plan as seen in the above orders. The patient has received an after-visit summary and questions were answered concerning future plans. Pt conveyed understanding of plan. Medication Side Effects and Warnings were discussed with patient: yes  Patient Labs were reviewed: yes  Patient Past Records were reviewed:  yes    Melchor Shaver.  Roberto Stark NP

## 2020-06-26 ENCOUNTER — HOSPITAL ENCOUNTER (OUTPATIENT)
Dept: MRI IMAGING | Age: 40
Discharge: HOME | End: 2020-06-26
Attending: NURSE PRACTITIONER
Payer: COMMERCIAL

## 2020-06-26 DIAGNOSIS — G89.29 CHRONIC LOW BACK PAIN WITH SCIATICA, SCIATICA LATERALITY UNSPECIFIED, UNSPECIFIED BACK PAIN LATERALITY: ICD-10-CM

## 2020-06-26 DIAGNOSIS — M54.40 CHRONIC LOW BACK PAIN WITH SCIATICA, SCIATICA LATERALITY UNSPECIFIED, UNSPECIFIED BACK PAIN LATERALITY: ICD-10-CM

## 2020-06-26 PROCEDURE — 72148 MRI LUMBAR SPINE W/O DYE: CPT

## 2020-06-29 ENCOUNTER — TELEPHONE (OUTPATIENT)
Dept: INTERNAL MEDICINE CLINIC | Age: 40
End: 2020-06-29

## 2020-06-29 DIAGNOSIS — M48.061 NEURAL FORAMINAL STENOSIS OF LUMBAR SPINE: Primary | ICD-10-CM

## 2020-07-13 ENCOUNTER — TELEPHONE (OUTPATIENT)
Dept: INTERNAL MEDICINE CLINIC | Age: 40
End: 2020-07-13

## 2020-07-13 NOTE — TELEPHONE ENCOUNTER
Pt states he wants to get released to go back to work.  He was waiting a return call from [facility] but has not heard anything from them yet Pt # [phone]

## 2020-07-14 NOTE — TELEPHONE ENCOUNTER
Informed pt he will be contacted by [facility] neuro for appt to help determine when he can return to work. Pt understood.

## 2020-07-14 NOTE — TELEPHONE ENCOUNTER
Pt information given to [facility] neuro, MRI results and office notes will be faxed to office. They will contact pt with appt information after all information is received.  Will let pt know

## 2020-09-11 ENCOUNTER — TELEPHONE (OUTPATIENT)
Dept: INTERNAL MEDICINE CLINIC | Age: 40
End: 2020-09-11

## 2020-09-11 NOTE — TELEPHONE ENCOUNTER
Pt states he would like a return call ref to himself, his disability and not being able to return to work.   Pt # [phone]

## 2020-09-16 NOTE — TELEPHONE ENCOUNTER
Pt states he just needs to sign his medical release form, pt states he will not be able to make it to the office until around 4PM. Informed pt 4PM is fine he can come by the office anytime from opening [facility], only to sign forms, as the office will be closed AT 5PM. Pt understood.

## 2020-09-30 ENCOUNTER — VIRTUAL VISIT (OUTPATIENT)
Dept: INTERNAL MEDICINE CLINIC | Age: 40
End: 2020-09-30
Payer: COMMERCIAL

## 2020-09-30 DIAGNOSIS — Z79.1 NSAID LONG-TERM USE: ICD-10-CM

## 2020-09-30 DIAGNOSIS — M48.061 NEURAL FORAMINAL STENOSIS OF LUMBAR SPINE: ICD-10-CM

## 2020-09-30 DIAGNOSIS — E55.9 VITAMIN D DEFICIENCY: ICD-10-CM

## 2020-09-30 DIAGNOSIS — M48.061 NEURAL FORAMINAL STENOSIS OF LUMBAR SPINE: Primary | ICD-10-CM

## 2020-09-30 DIAGNOSIS — E78.2 MIXED HYPERLIPIDEMIA: ICD-10-CM

## 2020-09-30 PROCEDURE — 99213 OFFICE O/P EST LOW 20 MIN: CPT | Performed by: NURSE PRACTITIONER

## 2020-09-30 RX ORDER — PREGABALIN 100 MG/1
100 CAPSULE ORAL 3 TIMES DAILY
Qty: 90 CAP | Refills: 0 | OUTPATIENT
Start: 2020-09-30 | End: 2021-12-21

## 2020-09-30 RX ORDER — TRAMADOL HYDROCHLORIDE 50 MG/1
50 TABLET ORAL
Qty: 20 TAB | Refills: 0 | Status: SHIPPED | OUTPATIENT
Start: 2020-09-30 | End: 2020-10-07

## 2020-09-30 RX ORDER — METHOCARBAMOL 500 MG/1
500 TABLET, FILM COATED ORAL
Qty: 60 TAB | Refills: 0 | Status: SHIPPED | OUTPATIENT
Start: 2020-09-30 | End: 2020-09-30 | Stop reason: SDUPTHER

## 2020-09-30 RX ORDER — NAPROXEN 500 MG/1
500 TABLET ORAL
Qty: 60 TAB | Refills: 1 | OUTPATIENT
Start: 2020-09-30 | End: 2021-12-21

## 2020-09-30 RX ORDER — NAPROXEN 500 MG/1
500 TABLET ORAL
Qty: 60 TAB | Refills: 1 | Status: SHIPPED | OUTPATIENT
Start: 2020-09-30 | End: 2020-09-30 | Stop reason: SDUPTHER

## 2020-09-30 RX ORDER — TRAMADOL HYDROCHLORIDE 50 MG/1
50 TABLET ORAL
Qty: 20 TAB | Refills: 0 | Status: SHIPPED | OUTPATIENT
Start: 2020-09-30 | End: 2020-09-30 | Stop reason: SDUPTHER

## 2020-09-30 RX ORDER — METHOCARBAMOL 500 MG/1
500 TABLET, FILM COATED ORAL
Qty: 60 TAB | Refills: 0 | OUTPATIENT
Start: 2020-09-30 | End: 2021-12-21

## 2020-09-30 RX ORDER — PREGABALIN 100 MG/1
100 CAPSULE ORAL 3 TIMES DAILY
Qty: 90 CAP | Refills: 0 | Status: SHIPPED | OUTPATIENT
Start: 2020-09-30 | End: 2020-09-30 | Stop reason: SDUPTHER

## 2020-09-30 NOTE — TELEPHONE ENCOUNTER
I forgot to change pt's pharmacy during [address]. Pending medications to correct pharmacy.

## 2020-09-30 NOTE — PROGRESS NOTES
Agusto Schulte is a 45 y.o. male who was seen by synchronous (real-time) audio-video technology on 9/30/2020 for Back Pain (follow up, pt states back pain has gotten worse in the past couple days ) and Leg Pain (right leg )        Assessment & Plan:   Diagnoses and all orders for this visit:    1. Neural foraminal stenosis of lumbar spine  -     naproxen (NAPROSYN) 500 mg tablet; Take 1 Tab by mouth two (2) times daily as needed for Pain. WITH FOOD. FOR PAIN. DO NOT TAKE WITH OTHER NSAIDS  -     methocarbamoL (ROBAXIN) 500 mg tablet; Take 1 Tab by mouth three (3) times daily as needed for Pain. Not with flexeril  -     pregabalin (LYRICA) 100 mg capsule; Take 1 Cap by mouth three (3) times daily. Max Daily Amount: 300 mg.  -     traMADoL (ULTRAM) 50 mg tablet; Take 1 Tab by mouth every eight (8) hours as needed for Pain for up to 7 days. Max Daily Amount: 150 mg. For severe pain only    2. NSAID long-term use  -     METABOLIC PANEL, COMPREHENSIVE  -     CBC W/O DIFF    3. Vitamin D deficiency  -     VITAMIN D, 25 HYDROXY; Future    4. Mixed hyperlipidemia  -     LIPID PANEL      The complexity of medical decision making for this visit is moderate     Try lyrica instead of gabapentin,   Do not take ibup and naproxen together  Consider fu w/ [facility]      Subjective:       Prior to Admission medications    Medication Sig Start Date End Date Taking? Authorizing Provider   ibuprofen (MOTRIN) 800 mg tablet Take  by mouth. Provider, Historical   acetaminophen-codeine (Tylenol-Codeine #3) 300-30 mg per tablet Take 1 Tab by mouth every four (4) hours as needed for Pain. Provider, Historical   naproxen (NAPROSYN) 500 mg tablet Take 1 Tab by mouth two (2) times daily as needed for Pain. WITH FOOD. FOR PAIN. DO NOT TAKE WITH OTHER NSAIDS 6/22/20   Leeanna OATES NP   methocarbamoL (ROBAXIN) 500 mg tablet Take 1 Tab by mouth three (3) times daily as needed for Pain.  Not with flexeril 6/22/20   Ephraim Henry NP   gabapentin (NEURONTIN) 300 mg capsule Take 1 Cap by mouth three (3) times daily. 6/22/20   Ephraim Henry NP   predniSONE (STERAPRED) 5 mg dose pack See administration instruction per 5mg dose pack 12/16/19   Antonia OATES NP   SUMAtriptan (IMITREX) 100 mg tablet TAKE 1 TAB BY MOUTH ONCE AS NEEDED FOR MIGRAINE FOR UP TO 1 DOSE. 4/3/19   Provider, Historical   albuterol (PROVENTIL HFA, VENTOLIN HFA, PROAIR HFA) 90 mcg/actuation inhaler Take 1 Puff by inhalation every six (6) hours as needed for Wheezing. 5/10/19   Ephraim Henry NP         ROS     Lumbar stenosis: see past notes for detail, but not a surgical candidate per [facility]- too risky, needing full back reconstruction. Has done PT, rc'd injections  Pain w/ simply standing. He has been unable to work due to pain and demands of job  He is seeking disability  Gabapentin is not helping   He alternates btwn ibup and naproxen      Objective:   No flowsheet data found.      [INSTRUCTIONS:  \"[x]\" Indicates a positive item  \"[]\" Indicates a negative item  -- DELETE ALL ITEMS NOT EXAMINED]    Constitutional: [x] Appears well-developed and well-nourished [x] No apparent distress      [] Abnormal -     Mental status: [x] Alert and awake  [x] Oriented to person/place/time [x] Able to follow commands    [] Abnormal -     Eyes:   EOM    [x]  Normal    [] Abnormal -   Sclera  [x]  Normal    [] Abnormal -          Discharge [x]  None visible   [] Abnormal -     HENT: [x] Normocephalic, atraumatic  [] Abnormal -   [x] Mouth/Throat: Mucous membranes are moist    External Ears [x] Normal  [] Abnormal -    Neck: [x] No visualized mass [] Abnormal -     Pulmonary/Chest: [x] Respiratory effort normal   [x] No visualized signs of difficulty breathing or respiratory distress        [] Abnormal -      Musculoskeletal:   [x] Normal gait with no signs of ataxia         [x] Normal range of motion of neck        [] Abnormal - Neurological:        [x] No Facial Asymmetry (Cranial nerve 7 motor function) (limited exam due to video visit)          [x] No gaze palsy        [] Abnormal -          Skin:        [x] No significant exanthematous lesions or discoloration noted on facial skin         [] Abnormal -            Psychiatric:       [x] Normal Affect [] Abnormal -        [x] No Hallucinations    Other pertinent observable physical exam findings:-        We discussed the expected course, resolution and complications of the diagnosis(es) in detail. Medication risks, benefits, costs, interactions, and alternatives were discussed as indicated. I advised him to contact the office if his condition worsens, changes or fails to improve as anticipated. He expressed understanding with the diagnosis(es) and plan. Evan Sebastian, who was evaluated through a patient-initiated, synchronous (real-time) audio-video encounter, and/or his healthcare decision maker, is aware that it is a billable service, with coverage as determined by his insurance carrier. He provided verbal consent to proceed: Yes, and patient identification was verified. It was conducted pursuant to the emergency declaration under the [address] [place] [address] authority and the [employer] Act. A caregiver was present when appropriate. Ability to conduct physical exam was limited. I was at home. The patient was at home. Denisse Chong NP

## 2020-09-30 NOTE — PROGRESS NOTES
Chief Complaint   Patient presents with    Back Pain     follow up, pt states back pain has gotten worse in the past couple days     Leg Pain     right leg      Pt rates back and leg pain 5/10 when sitting       1. Have you been to the ER, urgent care clinic since your last visit? Hospitalized since your last visit? No    2. Have you seen or consulted any other health care providers outside of the [address] [place] [address] since your last visit? Include any pap smears or colon screening.  No

## 2020-09-30 NOTE — Clinical Note
He has been having trouble getting his medical records - needs our records to his disability . .. Do you have the number for medical records to give him?

## 2021-11-25 ENCOUNTER — HOSPITAL ENCOUNTER (EMERGENCY)
Age: 41
Discharge: HOME | End: 2021-11-25
Attending: EMERGENCY MEDICINE
Payer: COMMERCIAL

## 2021-11-25 ENCOUNTER — APPOINTMENT (OUTPATIENT)
Dept: GENERAL RADIOLOGY | Age: 41
End: 2021-11-25
Attending: EMERGENCY MEDICINE
Payer: COMMERCIAL

## 2021-11-25 VITALS
SYSTOLIC BLOOD PRESSURE: 115 MMHG | WEIGHT: 127 LBS | HEIGHT: 64 IN | BODY MASS INDEX: 21.68 KG/M2 | DIASTOLIC BLOOD PRESSURE: 99 MMHG | OXYGEN SATURATION: 100 % | TEMPERATURE: 98.2 F | RESPIRATION RATE: 16 BRPM | HEART RATE: 77 BPM

## 2021-11-25 DIAGNOSIS — M79.672 LEFT FOOT PAIN: Primary | ICD-10-CM

## 2021-11-25 DIAGNOSIS — M25.572 ARTHRALGIA OF LEFT FOOT: ICD-10-CM

## 2021-11-25 PROCEDURE — 73630 X-RAY EXAM OF FOOT: CPT

## 2021-11-25 PROCEDURE — 99283 EMERGENCY DEPT VISIT LOW MDM: CPT

## 2021-11-25 PROCEDURE — 74011250637 HC RX REV CODE- 250/637: Performed by: EMERGENCY MEDICINE

## 2021-11-25 RX ORDER — IBUPROFEN 800 MG/1
800 TABLET ORAL
Qty: 20 TABLET | Refills: 0 | Status: SHIPPED | OUTPATIENT
Start: 2021-11-25 | End: 2021-12-02

## 2021-11-25 RX ORDER — IBUPROFEN 400 MG/1
800 TABLET ORAL
Status: COMPLETED | OUTPATIENT
Start: 2021-11-25 | End: 2021-11-25

## 2021-11-25 RX ORDER — GABAPENTIN 600 MG/1
600 TABLET ORAL DAILY
COMMUNITY
End: 2021-12-21

## 2021-11-25 RX ADMIN — IBUPROFEN 800 MG: 400 TABLET, FILM COATED ORAL at 21:03

## 2021-11-26 NOTE — ED PROVIDER NOTES
52-year-old male with a history of asthma and scoliosis presents ambulatory to the ED with a complaint of atraumatic left foot pain which began 2 and half days ago. Patient states it hurts to stand and put weight on the foot. 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 He has tried ice, Tylenol, naproxen and Epson salt soaks and is still having pain. Past Medical History:   Diagnosis Date    Asthma     Low back pain     Scoliosis 1989       Past Surgical History:   Procedure Laterality Date    HX OTHER SURGICAL      sx to enlarge esophagus as infant    HX OTHER SURGICAL      hernia repair as infant         Family History:   Problem Relation Age of Onset    Hypertension Father     Diabetes Maternal Grandmother     Cancer Paternal Grandmother        Social History     Socioeconomic History    Marital status: LEGALLY      Spouse name: Not on file    Number of children: Not on file    Years of education: Not on file    Highest education level: Not on file   Occupational History    Not on file   Tobacco Use    Smoking status: Current Every Day Smoker     Packs/day: 0.50    Smokeless tobacco: Never Used    Tobacco comment: 10 daily   Substance and Sexual Activity    Alcohol use: Yes     Comment: rarely    Drug use: No    Sexual activity: Yes     Partners: Female   Other Topics Concern    Not on file   Social History Narrative    Works at [employer]        3/2018: recently  from his wife. Has 3 children          Social Determinants of Health     Financial Resource Strain:     Difficulty of Paying Living Expenses: Not on file   Food Insecurity:     Worried About Running Out of Food in the Last Year: Not on file    Yash of Food in the Last Year: Not on file   Transportation Needs:     Lack of Transportation (Medical):  Not on file    Lack of Transportation (Non-Medical): Not on file   Physical Activity:     Days of Exercise per Week: Not on file    Minutes of Exercise per Session: Not on file   Stress:     Feeling of Stress : Not on file   Social Connections:     Frequency of Communication with Friends and Family: Not on file    Frequency of Social Gatherings with Friends and Family: Not on file    Attends [employer] Services: Not on file    Active Member of [address] [place] [address] or Organizations: Not on file    Attends Club or Organization Meetings: Not on file    Marital Status: Not on file   Intimate Partner Violence:     Fear of Current or Ex-Partner: Not on file    Emotionally Abused: Not on file    Physically Abused: Not on file    Sexually Abused: Not on file   Housing Stability:     Unable to Pay for Housing in the Last Year: Not on file    Number of [place] in the Last Year: Not on file    Unstable Housing in the Last Year: Not on file         ALLERGIES: Aspirin, Aspirnil, Iodine, and Shellfish derived    Review of Systems   Constitutional: Negative. Negative for fever. HENT: Negative. Negative for drooling, facial swelling and trouble swallowing. Eyes: Negative. Negative for discharge and redness. Respiratory: Negative. Negative for chest tightness, shortness of breath and wheezing. Cardiovascular: Negative. Negative for chest pain. Gastrointestinal: Negative. Negative for abdominal distention, abdominal pain, constipation, diarrhea, nausea and vomiting. Endocrine: Negative. Genitourinary: Negative. Negative for difficulty urinating and dysuria. Musculoskeletal: Positive for arthralgias. Negative for myalgias. Skin: Negative. Negative for color change and rash. Allergic/Immunologic: Negative. Neurological: Negative. Negative for syncope, facial asymmetry and speech difficulty. Hematological: Negative. Psychiatric/Behavioral: Negative. Negative for agitation and confusion.    All other systems reviewed and are negative. Vitals:    11/25/21 2051   BP: (!) 115/99   Pulse: 77   Resp: 16   Temp: 98.2 °F (36.8 °C)   SpO2: 100%   Weight: 57.6 kg (127 lb)   Height: 5' 4\" (1.626 m)            Physical Exam  Vitals and nursing note reviewed. Constitutional:       Appearance: Normal appearance. He is well-developed. HENT:      Head: Normocephalic and atraumatic. Eyes:      Conjunctiva/sclera: Conjunctivae normal.   Cardiovascular:      Rate and Rhythm: Normal rate and regular rhythm. Pulmonary:      Effort: No accessory muscle usage or respiratory distress. Abdominal:      Palpations: Abdomen is soft. Tenderness: There is no abdominal tenderness. Musculoskeletal:         General: Normal range of motion. Cervical back: Neck supple. Comments: No edema, ecchymosis, or visible deformity. Tenderness of the first and TP joint and first metatarsal of left foot   Skin:     General: Skin is warm and dry. Neurological:      Mental Status: He is alert and oriented to person, place, and time. Psychiatric:         Behavior: Behavior normal.         Thought Content: Thought content normal.          MDM  Number of Diagnoses or Management Options         Procedures      LABORATORY TESTS:  No results found for this or any previous visit (from the past 12 hour(s)). IMAGING RESULTS:  XR FOOT LT MIN 3 V   Final Result   No acute abnormality. MEDICATIONS GIVEN:  Medications   ibuprofen (MOTRIN) tablet 800 mg (800 mg Oral Given 11/25/21 2103)       IMPRESSION:  1. Left foot pain    2. Arthralgia of left foot        PLAN:  1. Discharge Medication List as of 11/25/2021 10:25 PM        2.    Follow-up Information     Follow up With Specialties Details Why Contact Info    Papo Ortiz DPM Podiatry   [address]  [address] - [place] [facility]GENCY DEPT Emergency Medicine  As needed, If symptoms worsen [address] [phone]        Return to ED if worse

## 2021-11-26 NOTE — ED NOTES
Pt arrives ambulatory with C/O L foot pain x 2 days. Pt denies injury to foot. 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 Pt states he feels this may be contributing to his foot pain. Pt is alert and oriented x 4. RR even and unlabored. Pt updated on plan of care and has no questions or concerns at this time. Call bell is within reach. Emergency Department Nursing Plan of Care       The Nursing Plan of Care is developed from the Nursing assessment and Emergency Department Attending provider initial evaluation. The plan of care may be reviewed in the ED Provider note.     The Plan of Care was developed with the following considerations:   Patient / Family readiness to learn indicated by:verbalized understanding  Persons(s) to be included in education: patient  Barriers to Learning/Limitations:No    Signed     Lauren Prince RN    11/25/2021   9:04 PM

## 2021-12-21 ENCOUNTER — HOSPITAL ENCOUNTER (EMERGENCY)
Age: 41
Discharge: HOME | End: 2021-12-21
Attending: EMERGENCY MEDICINE
Payer: COMMERCIAL

## 2021-12-21 VITALS
HEIGHT: 64 IN | WEIGHT: 138 LBS | RESPIRATION RATE: 16 BRPM | HEART RATE: 102 BPM | TEMPERATURE: 99.3 F | SYSTOLIC BLOOD PRESSURE: 141 MMHG | OXYGEN SATURATION: 99 % | BODY MASS INDEX: 23.56 KG/M2 | DIASTOLIC BLOOD PRESSURE: 90 MMHG

## 2021-12-21 DIAGNOSIS — M54.50 LOW BACK PAIN WITHOUT SCIATICA, UNSPECIFIED BACK PAIN LATERALITY, UNSPECIFIED CHRONICITY: ICD-10-CM

## 2021-12-21 DIAGNOSIS — J06.9 VIRAL URI: Primary | ICD-10-CM

## 2021-12-21 PROCEDURE — 99283 EMERGENCY DEPT VISIT LOW MDM: CPT

## 2021-12-21 PROCEDURE — U0005 INFEC AGEN DETEC AMPLI PROBE: HCPCS

## 2021-12-21 PROCEDURE — 74011250637 HC RX REV CODE- 250/637: Performed by: EMERGENCY MEDICINE

## 2021-12-21 RX ORDER — ACETAMINOPHEN 500 MG
1000 TABLET ORAL ONCE
Status: COMPLETED | OUTPATIENT
Start: 2021-12-21 | End: 2021-12-21

## 2021-12-21 RX ADMIN — ACETAMINOPHEN 1000 MG: 500 TABLET ORAL at 23:05

## 2021-12-22 LAB
SARS-COV-2, XPLCVT: DETECTED
SOURCE, COVRS: ABNORMAL

## 2021-12-22 NOTE — ED NOTES
Pt arrives with family for suspected COVID. Pt endorses headache and chronic back pain. Other members of household are also sick with COVID-like symptoms. Reports he has been trying Ibuprofen and Naproxen for back pain relief. Warm blanket offered, call bell within reach, safety precautions in place, bed locked and in the lowest position. Emergency Department Nursing Plan of Care       The Nursing Plan of Care is developed from the Nursing assessment and Emergency Department Attending provider initial evaluation. The plan of care may be reviewed in the ED Provider note.     The Plan of Care was developed with the following considerations:   Patient / Family readiness to learn indicated by:verbalized understanding  Persons(s) to be included in education: patient  Barriers to Learning/Limitations:No    Signed     Julian Mills RN    12/21/2021   10:51 PM

## 2021-12-22 NOTE — ED PROVIDER NOTES
EMERGENCY DEPARTMENT HISTORY AND PHYSICAL EXAM      Date: 12/21/2021  Patient Name: Ade Marshall  Patient Age and Sex: 37 y.o. male  MRN:  [id number]  [facility]:  [id number]    History of Presenting Illness     Chief Complaint   Patient presents with    Concern For COVID-19 (Coronavirus)    Back Pain       History Provided By: Patient    Ability to gather history was limited by:     HPI: Ade Marshall, 37 y.o. male with no significant past medical history, requests testing for COVID-19. Multiple family members in his home have upper respiratory infections and sore throats and there is concern for COVID-19 in the home. The patient himself has been vaccinated for Covid and does not have any significant acute symptoms other than a mild general malaise and mild headache. No cough or respiratory symptoms. He is vaccinated for Covid. He has mild chronic low back pain, unchanged from baseline. Location:    Quality:      Severity:    Duration:   Timing:      Context:    Modifying factors:   Associated symptoms:     Past History      The patient's medical, surgical, and social history on file were reviewed by me today.  The family history was reviewed by me today and was non-contributory, unless otherwise specified below:    Past Medical History:  Past Medical History:   Diagnosis Date    Asthma     Low back pain     Scoliosis 1989       Past Surgical History:  Past Surgical History:   Procedure Laterality Date    HX OTHER SURGICAL      sx to enlarge esophagus as infant    HX OTHER SURGICAL      hernia repair as infant       Family History:  Family History   Problem Relation Age of Onset    Hypertension Father     Diabetes Maternal Grandmother     Cancer Paternal Grandmother        Social History:  Social History     Tobacco Use    Smoking status: Current Every Day Smoker     Packs/day: 0.50    Smokeless tobacco: Never Used    Tobacco comment: 10 daily   Substance Use Topics    Alcohol use:  Yes Comment: rarely    Drug use: No       Current Medications:  No current facility-administered medications on file prior to encounter. Current Outpatient Medications on File Prior to Encounter   Medication Sig Dispense Refill    [DISCONTINUED] gabapentin (NEURONTIN) 600 mg tablet Take 600 mg by mouth daily. (Patient not taking: Reported on 12/21/2021)      [DISCONTINUED] naproxen (NAPROSYN) 500 mg tablet Take 1 Tab by mouth two (2) times daily as needed for Pain. WITH FOOD. FOR PAIN. DO NOT TAKE WITH OTHER NSAIDS 60 Tab 1    [DISCONTINUED] methocarbamoL (ROBAXIN) 500 mg tablet Take 1 Tab by mouth three (3) times daily as needed for Pain. Not with flexeril (Patient not taking: Reported on 11/25/2021) 60 Tab 0    [DISCONTINUED] pregabalin (LYRICA) 100 mg capsule Take 1 Cap by mouth three (3) times daily. Max Daily Amount: 300 mg. (Patient not taking: Reported on 11/25/2021) 90 Cap 0    [DISCONTINUED] ibuprofen (MOTRIN) 800 mg tablet Take  by mouth.  [DISCONTINUED] SUMAtriptan (IMITREX) 100 mg tablet TAKE 1 TAB BY MOUTH ONCE AS NEEDED FOR MIGRAINE FOR UP TO 1 DOSE. (Patient not taking: Reported on 11/25/2021)  0    [DISCONTINUED] albuterol (PROVENTIL HFA, VENTOLIN HFA, PROAIR HFA) 90 mcg/actuation inhaler Take 1 Puff by inhalation every six (6) hours as needed for Wheezing. (Patient not taking: Reported on 12/21/2021) 1 Inhaler 1       Allergies: Allergies   Allergen Reactions    Aspirin Hives     Has tolerated ibuprofen in the past    Aspirnil Hives     Has tolerated ibuprofen in the past    Iodine Hives    Shellfish Derived Hives     Review of Systems    A complete ROS was reviewed by me today and was negative, unless otherwise specified below:    Review of Systems   Constitutional: Positive for fatigue. Negative for fever. Musculoskeletal: Positive for back pain. Neurological: Positive for headaches. All other systems reviewed and are negative.       Physical Exam   Vital Signs  Patient Vitals for the past 8 hrs:   Temp Pulse Resp BP SpO2   12/21/21 2232 99.3 °F (37.4 °C) (!) 102 16 (!) 141/90 99 %          Physical Exam  Vitals and nursing note reviewed. Constitutional:       General: He is not in acute distress. Appearance: Normal appearance. He is well-developed. He is not ill-appearing. HENT:      Head: Normocephalic and atraumatic. Eyes:      General:         Right eye: No discharge. Left eye: No discharge. Conjunctiva/sclera: Conjunctivae normal.   Cardiovascular:      Rate and Rhythm: Normal rate and regular rhythm. Heart sounds: Normal heart sounds. No murmur heard. Pulmonary:      Effort: Pulmonary effort is normal. No respiratory distress. Breath sounds: Normal breath sounds. No wheezing. Abdominal:      General: There is no distension. Palpations: Abdomen is soft. Tenderness: There is no abdominal tenderness. Musculoskeletal:         General: No deformity. Normal range of motion. Cervical back: Normal range of motion and neck supple. Skin:     General: Skin is warm and dry. Findings: No rash. Neurological:      General: No focal deficit present. Mental Status: He is alert and oriented to person, place, and time. Psychiatric:         Mood and Affect: Mood normal.         Behavior: Behavior normal.         Thought Content: Thought content normal.         Diagnostic Study Results   Labs  No results found for this or any previous visit (from the past 24 hour(s)). Radiologic Studies  No orders to display     CT Results  (Last 48 hours)    None        CXR Results  (Last 48 hours)    None          Billable Procedures   Procedures    Medical Decision Making     I reviewed the patient's most recent Emergency Dept notes and diagnostic tests in formulating my MDM on today's visit. Provider Notes (Medical Decision Making):   30-year-old male presenting with possible exposure to COVID-19.   He is vaccinated for Covid, but has multiple family members in the home with viral upper respiratory infections right now. The entire family has presented for Covid testing. Clinically well-appearing, no distress, normal vital signs, afebrile. Lungs are clear. Normal oropharynx. Swabbed for COVID-19. Provided Tylenol for his longstanding musculoskeletal back pain. Abdirahman Wild MD  11:02 PM  12/21/2021     Consults:    Social History     Tobacco Use    Smoking status: Current Every Day Smoker     Packs/day: 0.50    Smokeless tobacco: Never Used    Tobacco comment: 10 daily   Substance Use Topics    Alcohol use: Yes     Comment: rarely    Drug use: No       Medications Administered during ED course:  Medications   acetaminophen (TYLENOL) tablet 1,000 mg (has no administration in time range)          Prescriptions from today's ED visit:  There are no discharge medications for this patient. Diagnosis and Disposition     Disposition:  Discharged    Clinical Impression:   1. Viral URI    2. Low back pain without sciatica, unspecified back pain laterality, unspecified chronicity        Attestation:  I personally performed the services described in this documentation on this date 12/21/2021 for patient Amparo Hazel. Abdirahman Wild MD        I was the first provider for this patient on this visit. To the best of my ability I reviewed relevant prior medical records, electrocardiograms, laboratories, and radiologic studies. The patient's presenting problems were discussed, and the patient was in agreement with the care plan formulated and outlined with them. Abdirahman Wild MD    Please note that this dictation was completed with Dragon voice recognition software. Quite often unanticipated grammatical, syntax, homophones, and other interpretive errors are inadvertently transcribed by the computer software. Please disregard these errors and excuse any errors that have escaped final proofreading.

## 2021-12-22 NOTE — ED TRIAGE NOTES
Reports having headache. Concerned for covid. Would like to get tested. Also reports back pain. Reports history of back pain.

## 2022-01-31 ENCOUNTER — APPOINTMENT (OUTPATIENT)
Dept: GENERAL RADIOLOGY | Age: 42
End: 2022-01-31
Attending: PHYSICIAN ASSISTANT
Payer: COMMERCIAL

## 2022-01-31 ENCOUNTER — HOSPITAL ENCOUNTER (EMERGENCY)
Age: 42
Discharge: HOME | End: 2022-01-31
Attending: STUDENT IN AN ORGANIZED HEALTH CARE EDUCATION/TRAINING PROGRAM
Payer: COMMERCIAL

## 2022-01-31 VITALS
RESPIRATION RATE: 18 BRPM | DIASTOLIC BLOOD PRESSURE: 98 MMHG | HEART RATE: 92 BPM | TEMPERATURE: 98.4 F | OXYGEN SATURATION: 100 % | SYSTOLIC BLOOD PRESSURE: 136 MMHG | WEIGHT: 138 LBS | HEIGHT: 65 IN | BODY MASS INDEX: 22.99 KG/M2

## 2022-01-31 DIAGNOSIS — Z72.0 TOBACCO ABUSE: ICD-10-CM

## 2022-01-31 DIAGNOSIS — L03.031 CELLULITIS OF TOE OF RIGHT FOOT: Primary | ICD-10-CM

## 2022-01-31 PROCEDURE — 74011250637 HC RX REV CODE- 250/637: Performed by: PHYSICIAN ASSISTANT

## 2022-01-31 PROCEDURE — 73660 X-RAY EXAM OF TOE(S): CPT

## 2022-01-31 PROCEDURE — 99283 EMERGENCY DEPT VISIT LOW MDM: CPT

## 2022-01-31 RX ORDER — CEPHALEXIN 500 MG/1
500 CAPSULE ORAL 4 TIMES DAILY
Qty: 28 CAPSULE | Refills: 0 | Status: SHIPPED | OUTPATIENT
Start: 2022-01-31 | End: 2022-02-07

## 2022-01-31 RX ORDER — HYDROCODONE BITARTRATE AND ACETAMINOPHEN 5; 325 MG/1; MG/1
1 TABLET ORAL
Status: DISCONTINUED | OUTPATIENT
Start: 2022-01-31 | End: 2022-01-31

## 2022-01-31 RX ORDER — SULFAMETHOXAZOLE AND TRIMETHOPRIM 800; 160 MG/1; MG/1
2 TABLET ORAL 2 TIMES DAILY
Qty: 40 TABLET | Refills: 0 | Status: SHIPPED | OUTPATIENT
Start: 2022-01-31 | End: 2022-02-10

## 2022-01-31 RX ORDER — ACETAMINOPHEN 500 MG
1000 TABLET ORAL
Status: COMPLETED | OUTPATIENT
Start: 2022-01-31 | End: 2022-01-31

## 2022-01-31 RX ADMIN — ACETAMINOPHEN 1000 MG: 500 TABLET ORAL at 22:59

## 2022-02-01 NOTE — ED NOTES
Pt presents ambulatory to ED complaining of right 4th digit pain x3 days. Pt reports taking Aleve at 1700 today. Redness and swelling noted to toe, pt reports drainage beginning today. Pt states he is driving, provider notified. Pt is alert and oriented x 4, RR even and unlabored, skin is warm and dry. Assesment completed and pt updated on plan of care. Emergency Department Nursing Plan of Care       The Nursing Plan of Care is developed from the Nursing assessment and Emergency Department Attending provider initial evaluation. The plan of care may be reviewed in the ED Provider note.     The Plan of Care was developed with the following considerations:   Patient / Family readiness to learn indicated by:verbalized understanding  Persons(s) to be included in education: patient  Barriers to Learning/Limitations:No    [address]    1/31/2022   10:42 PM

## 2022-02-01 NOTE — ED NOTES
Patient given copy of dc instructions and 0 paper script(s) and 2 electronic scripts. Patient verbalized understanding of instructions and script (s). Patient given a current medication reconciliation form and verbalized understanding of their medications. Patient verbalized understanding of the importance of discussing medications with  his or her physician or clinic they will be following up with. Patient alert and oriented and in no acute distress.

## 2022-02-01 NOTE — ED PROVIDER NOTES
EMERGENCY DEPARTMENT HISTORY AND PHYSICAL EXAM      Date: 1/31/2022  Patient Name: Avinash Gee    History of Presenting Illness     Chief Complaint   Patient presents with    Toe Pain     RIGHT foot third toe purulent discharge x \"couple days\"     History Provided By: Patient    HPI: Avinash Gee, 37 y.o. male with asthma, scoliosis, tobacco abuse who presents via self to the ED with cc of acute moderate aching right 4th toe pain x 3 days with purulent drainage noted today. NKI or trauma. Pain exacerbated with ambulation and palpation. Ibuprofen with mild relief. No fever, chills, n/v, numbness/tingling, focal weakness. No history of diabetes. PCP: Sarah Lobo, NP    There are no other complaints, changes, or physical findings at this time. No current facility-administered medications on file prior to encounter. No current outpatient medications on file prior to encounter. Past History     Past Medical History:  Past Medical History:   Diagnosis Date    Asthma     Low back pain     Scoliosis 1989     Past Surgical History:  Past Surgical History:   Procedure Laterality Date    HX OTHER SURGICAL      sx to enlarge esophagus as infant    HX OTHER SURGICAL      hernia repair as infant     Family History:  Family History   Problem Relation Age of Onset    Hypertension Father     Diabetes Maternal Grandmother     Cancer Paternal Grandmother      Social History:  Social History     Tobacco Use    Smoking status: Current Every Day Smoker     Packs/day: 0.50    Smokeless tobacco: Never Used    Tobacco comment: 10 daily   Substance Use Topics    Alcohol use: Yes     Comment: rarely    Drug use: Yes     Types: Marijuana     Allergies:   Allergies   Allergen Reactions    Aspirin Hives     Has tolerated ibuprofen in the past    Aspirnil Hives     Has tolerated ibuprofen in the past    Iodine Hives    Shellfish Derived Hives     Review of Systems   Review of Systems Constitutional: Negative for activity change, chills, fatigue and fever. HENT: Negative. Eyes: Negative. Respiratory: Negative. Negative for cough and shortness of breath. Cardiovascular: Negative for chest pain, palpitations and leg swelling. Gastrointestinal: Negative for abdominal pain, diarrhea, nausea and vomiting. Genitourinary: Negative for dysuria. Musculoskeletal: Positive for arthralgias and joint swelling. Negative for back pain, neck pain and neck stiffness. Skin: Positive for rash. Negative for wound. Neurological: Negative. Negative for weakness, numbness and headaches. Psychiatric/Behavioral: Negative. Physical Exam   Physical Exam  Vitals and nursing note reviewed. Constitutional:       General: He is not in acute distress. Appearance: Normal appearance. He is well-developed. He is not ill-appearing, toxic-appearing or diaphoretic. HENT:      Head: Normocephalic and atraumatic. Right Ear: Hearing and external ear normal.      Left Ear: Hearing and external ear normal.      Nose: Nose normal.   Eyes:      Conjunctiva/sclera: Conjunctivae normal.      Pupils: Pupils are equal, round, and reactive to light. Pulmonary:      Effort: Pulmonary effort is normal. No accessory muscle usage or respiratory distress. Musculoskeletal:         General: Normal range of motion. Cervical back: Normal range of motion. Right ankle: Normal.      Right foot: Normal range of motion and normal capillary refill. Swelling, tenderness and bony tenderness present. No deformity, bunion, Charcot foot, foot drop, prominent metatarsal heads, laceration or crepitus. Normal pulse. Left foot: Normal.      Comments: Moderate swelling to right 4th toe w/ no fluctuant lesion/ abscess, crepitus, streaking. Purulent drainage noted from medial toenail. Feet:      Right foot:      Skin integrity: Erythema and warmth present.  No ulcer, blister, skin breakdown, callus, dry skin or fissure. Skin:     General: Skin is warm and dry. Coloration: Skin is not pale. Findings: Rash present. No abscess. Rash is pustular. Neurological:      Mental Status: He is alert and oriented to person, place, and time. Psychiatric:         Speech: Speech normal.         Behavior: Behavior normal.         Thought Content: Thought content normal.         Judgment: Judgment normal.       Diagnostic Study Results   Labs -   No results found for this or any previous visit (from the past 12 hour(s)). Radiologic Studies -   XR 4TH TOE RT MIN 2 V   Final Result   No acute osseous or articular abnormality. Soft tissue swelling in   the fourth toe. XR 4TH TOE RT MIN 2 V    Result Date: 1/31/2022  No acute osseous or articular abnormality. Soft tissue swelling in the fourth toe. Medical Decision Making   I am the first provider for this patient. I reviewed the vital signs, available nursing notes, past medical history, past surgical history, family history and social history. Vital Signs-Reviewed the patient's vital signs. Patient Vitals for the past 24 hrs:   Temp Pulse Resp BP SpO2   01/31/22 2222 98.4 °F (36.9 °C) 92 18 (!) 136/98 100 %     Pulse Oximetry Analysis - 100% on RA (normal)    Records Reviewed: Nursing Notes, Old Medical Records, Previous Radiology Studies and Previous Laboratory Studies    Provider Notes (Medical Decision Making):   36 yo M presents with rt 4th toe pain and swelling/ purulent drainage x 3 days. DDX: ingrown toenail, abscess, paronychia, cellulitis. Will obtain imaging to rule out fracture and osteo and treat with antibiotics and close podiatry follow up. ED Course:   Initial assessment performed. The patients presenting problems have been discussed, and they are in agreement with the care plan formulated and outlined with them. I have encouraged them to ask questions as they arise throughout their visit.       TOBACCO COUNSELING:  Upon evaluation, pt expressed that they are a current tobacco user. Pt has been counseled on the dangers of smoking and was encouraged to quit as soon as possible in order to decrease further risks to their health. Pt has conveyed their understanding of the risks involved should they continue to use tobacco products. 4 min discussion. Progress Note:   Updated pt on all returned results and findings. Discussed the importance of proper follow up as referred below along with return precautions. Pt in agreement with the care plan and expresses agreement with and understanding of all items discussed. Disposition:  11:09 PM  I have discussed with patient their diagnosis, treatment, and follow up plan. The patient agrees to follow up as outlined in discharge paperwork and also to return to the ED with any worsening. Ashley Avila PA-C        PLAN:  1. Discharge Medication List as of 1/31/2022 11:00 PM      START taking these medications    Details   cephALEXin (Keflex) 500 mg capsule Take 1 Capsule by mouth four (4) times daily for 7 days. , Normal, Disp-28 Capsule, R-0      trimethoprim-sulfamethoxazole (Bactrim DS) 160-800 mg per tablet Take 2 Tablets by mouth two (2) times a day for 10 days. , Normal, Disp-40 Tablet, R-0           2. Follow-up Information     Follow up With Specialties Details Why Contact Info    Agusto Ellis DPM Podiatry Schedule an appointment as soon as possible for a visit in 2 days As needed [address] [place] [address] [address] [facility]      Ashley Desai NP Nurse Practitioner Schedule an appointment as soon as possible for a visit in 1 week As needed Jason Bernardo  [address] [place] [address]  [phone]      [address] [place] [address] DEPT Emergency Medicine Go to  As needed, [facility] symptoms worsen [facility]  [phone]        Return to ED if worse     Diagnosis     Clinical Impression:   1. Cellulitis of toe of right foot    2. Tobacco abuse            Please note that this dictation was completed with Dragon, computer voice recognition software. Quite often unanticipated grammatical, syntax, homophones, and other interpretive errors are inadvertently transcribed by the computer software. Please disregard these errors. Additionally, please excuse any errors that have escaped final proofreading.

## 2022-02-01 NOTE — DISCHARGE INSTRUCTIONS
It was a pleasure taking care of you at [facility] Emergency Department today. We know that when you come to [facility], you are entrusting us with your health, comfort, and safety. Our physicians and nurses honor that trust, and we truly appreciate the opportunity to care for you and your loved ones. We also value our feedback. If you receive a survey about your Emergency Department experience today, please fill it out. We care about our patients' feedback, and we listen to what you have to say. Thank you!

## 2022-03-18 PROBLEM — M48.061 NEURAL FORAMINAL STENOSIS OF LUMBAR SPINE: Status: ACTIVE | Noted: 2020-06-29

## 2022-03-18 PROBLEM — E78.5 HYPERLIPIDEMIA: Status: ACTIVE | Noted: 2017-09-21

## 2022-03-20 PROBLEM — F17.200 SMOKER: Status: ACTIVE | Noted: 2019-01-14

## 2022-04-28 ENCOUNTER — OFFICE VISIT (OUTPATIENT)
Dept: INTERNAL MEDICINE CLINIC | Age: 42
End: 2022-04-28
Payer: COMMERCIAL

## 2022-04-28 VITALS
WEIGHT: 130 LBS | HEART RATE: 75 BPM | BODY MASS INDEX: 21.66 KG/M2 | SYSTOLIC BLOOD PRESSURE: 126 MMHG | OXYGEN SATURATION: 97 % | TEMPERATURE: 98.6 F | RESPIRATION RATE: 18 BRPM | HEIGHT: 65 IN | DIASTOLIC BLOOD PRESSURE: 78 MMHG

## 2022-04-28 DIAGNOSIS — J45.20 MILD INTERMITTENT ASTHMA WITHOUT COMPLICATION: ICD-10-CM

## 2022-04-28 DIAGNOSIS — Z13.228 SCREENING FOR ENDOCRINE, METABOLIC AND IMMUNITY DISORDER: ICD-10-CM

## 2022-04-28 DIAGNOSIS — Z13.29 SCREENING FOR ENDOCRINE, METABOLIC AND IMMUNITY DISORDER: ICD-10-CM

## 2022-04-28 DIAGNOSIS — M54.16 LUMBAR RADICULITIS: Primary | ICD-10-CM

## 2022-04-28 DIAGNOSIS — Z13.0 SCREENING FOR ENDOCRINE, METABOLIC AND IMMUNITY DISORDER: ICD-10-CM

## 2022-04-28 DIAGNOSIS — R10.33 PERIUMBILICAL PAIN: ICD-10-CM

## 2022-04-28 DIAGNOSIS — F17.200 SMOKER: ICD-10-CM

## 2022-04-28 PROBLEM — S99.929A INJURY OF FOOT: Status: ACTIVE | Noted: 2022-04-28

## 2022-04-28 PROBLEM — M54.17 LUMBOSACRAL RADICULITIS: Status: ACTIVE | Noted: 2020-02-08

## 2022-04-28 PROBLEM — M51.26 DISPLACEMENT OF LUMBAR INTERVERTEBRAL DISC WITHOUT MYELOPATHY: Status: ACTIVE | Noted: 2020-02-08

## 2022-04-28 PROBLEM — M62.830 SPASM OF BACK MUSCLES: Status: ACTIVE | Noted: 2022-04-28

## 2022-04-28 PROCEDURE — 99214 OFFICE O/P EST MOD 30 MIN: CPT | Performed by: NURSE PRACTITIONER

## 2022-04-28 RX ORDER — ALBUTEROL SULFATE 90 UG/1
1 AEROSOL, METERED RESPIRATORY (INHALATION)
Qty: 18 G | Refills: 2 | Status: SHIPPED | OUTPATIENT
Start: 2022-04-28

## 2022-04-28 RX ORDER — TIZANIDINE 4 MG/1
4 TABLET ORAL
Qty: 30 TABLET | Refills: 0 | Status: SHIPPED | OUTPATIENT
Start: 2022-04-28

## 2022-04-28 RX ORDER — NAPROXEN 500 MG/1
500 TABLET ORAL 2 TIMES DAILY WITH MEALS
COMMUNITY

## 2022-04-28 RX ORDER — ALBUTEROL SULFATE 90 UG/1
AEROSOL, METERED RESPIRATORY (INHALATION)
COMMUNITY
End: 2022-04-28 | Stop reason: SDUPTHER

## 2022-04-28 NOTE — PROGRESS NOTES
Chief Complaint   Patient presents with    Abdominal Pain     occ pain around navel when touching or bending over x 1 week, pt states he does a lot of heavy lifting at work        1. \"Have you been to the ER, urgent care clinic since your last visit? Hospitalized since your last visit? \" No    2. \"Have you seen or consulted any other health care providers outside of the [address] [place] [address] since your last visit? \" No     3. For patients aged 39-70: Has the patient had a colonoscopy / FIT/ Cologuard? NA - based on age      If the patient is female:    4. For patients aged 41-77: Has the patient had a mammogram within the past 2 years? NA - based on age or sex      11. For patients aged 21-65: Has the patient had a pap smear?  NA - based on age or sex

## 2022-04-28 NOTE — PROGRESS NOTES
Subjective: (As above and below)     Chief Complaint   Patient presents with    Abdominal Pain     occ pain around navel when touching or bending over x 1 week, pt states he does a lot of heavy lifting at work      Roly Muro is a 40y.o. year old male who presents for umbilical pain  Intermittent x 1 week  Worse w/ bending over and palpation  No visible bulge  No nausea, vomiting, diarrhea or constipation  Uses naproxen maybe 1-2 x per day for back  No extra nsaids  No etoh  Diet is fair, but pain not seemingly r/t foods  No epigastric burning  No uti s/s no prostatic s/s      Smoker; reducing    Lumbosacral radiculitis; stable    Asthma; stable        Reviewed PmHx, RxHx, FmHx, SocHx, AllgHx and updated in chart. Family History   Problem Relation Age of Onset    Hypertension Father     Diabetes Maternal Grandmother     Cancer Paternal Grandmother        Past Medical History:   Diagnosis Date    Asthma     Low back pain     Scoliosis 1989      Social History     Socioeconomic History    Marital status: LEGALLY    Tobacco Use    Smoking status: Current Every Day Smoker     Packs/day: 0.50    Smokeless tobacco: Never Used    Tobacco comment: 10 daily   Substance and Sexual Activity    Alcohol use: Yes     Comment: rarely    Drug use: Yes     Types: Marijuana    Sexual activity: Yes     Partners: Female   Social History Narrative    Works at [employer]        3/2018: recently  from his wife. Has 3 children               Current Outpatient Medications   Medication Sig    naproxen (NAPROSYN) 500 mg tablet Take 500 mg by mouth two (2) times daily (with meals).  albuterol (PROVENTIL HFA, VENTOLIN HFA, PROAIR HFA) 90 mcg/actuation inhaler Take  by inhalation. No current facility-administered medications for this visit. Review of Systems:   Constitutional:    Negative for fever and chills, negative diaphoresis.    HEENT:              Negative for neck pain and stiffness. Eyes:                  Negative for visual disturbance, itching, redness or discharge. Respiratory:        Negative for cough and shortness of breath. Cardiovascular:  Negative for chest pain and palpitations. Gastrointestinal: Negative for nausea, vomiting, diarrhea or constipation. +abd pain  Genitourinary:     Negative for dysuria and frequency. Musculoskeletal: Negative for falls, tenderness and swelling. Skin:                    Negative for rash, masses or lesions. Neurological:       Negative for dizzyness, seizure, loss of consciousness, weakness and numbness. Objective:     Vitals:    04/28/22 1412   BP: 126/78   Pulse: 75   Resp: 18   Temp: 98.6 °F (37 °C)   TempSrc: Temporal   SpO2: 97%   Weight: 130 lb (59 kg)   Height: 5' 5\" (1.651 m)         Gen: Oriented to person, place and time and well-developed, well-nourished and in no distress. HEENT:    Head: normocephalic and atraumatic. Eyes:  EOM are normal. Pupils equal and round. Neck:  Normal range of motion. Neck supple. Cardiovascular: normal rate, regular rhythm and normal heart sounds. Pulmonary/Chest:  Effort normal and breath sounds normal.  No respiratory distress. No wheezes, no rales. Abdominal: soft, normal  bowel sounds. He is quite tender to umbilicus, no visible bruising/bulge    Musculoskeletal:  No edema, no tenderness. No calf tenderness or edema. Neurological:  Alert, oriented to person, place and time. Skin: skin is warm and dry. Assessment/ Plan:         1. Lumbar radiculitis    - tiZANidine (ZANAFLEX) 4 mg tablet; Take 1 Tablet by mouth three (3) times daily as needed for Muscle Spasm(s). Dispense: 30 Tablet; Refill: 0    2. Periumbilical pain  ED if acute worsening  - AMYLASE; Future  - AMYLASE  - CT ABD W WO CONT; Future  - tiZANidine (ZANAFLEX) 4 mg tablet; Take 1 Tablet by mouth three (3) times daily as needed for Muscle Spasm(s). Dispense: 30 Tablet; Refill: 0    3. Smoker      4. Mild intermittent asthma without complication    - albuterol (PROVENTIL HFA, VENTOLIN HFA, PROAIR HFA) 90 mcg/actuation inhaler; Take 1 Puff by inhalation every six (6) hours as needed for Wheezing. Dispense: 18 g; Refill: 2    5. Screening for endocrine, metabolic and immunity disorder    - METABOLIC PANEL, COMPREHENSIVE  - CBC W/O DIFF  - LIPASE; Future  - LIPASE      I have discussed the diagnosis with the patient and the intended plan as seen in the above orders. The patient has received an after-visit summary and questions were answered concerning future plans. Pt conveyed understanding of plan. Medication Side Effects and Warnings were discussed with patient: yes  Patient Labs were reviewed: yes  Patient Past Records were reviewed:  yes    Leeanne Valencia.  Bernarda Cruz NP

## 2022-04-29 LAB
ALBUMIN SERPL-MCNC: 4.9 G/DL (ref 4–5)
ALBUMIN/GLOB SERPL: 1.5 {RATIO} (ref 1.2–2.2)
ALP SERPL-CCNC: 72 IU/L (ref 44–121)
ALT SERPL-CCNC: 12 IU/L (ref 0–44)
AMYLASE SERPL-CCNC: 70 U/L (ref 31–110)
AST SERPL-CCNC: 15 IU/L (ref 0–40)
BILIRUB SERPL-MCNC: 0.3 MG/DL (ref 0–1.2)
BUN SERPL-MCNC: 11 MG/DL (ref 6–24)
BUN/CREAT SERPL: 10 (ref 9–20)
CALCIUM SERPL-MCNC: 9.6 MG/DL (ref 8.7–10.2)
CHLORIDE SERPL-SCNC: 102 MMOL/L (ref 96–106)
CHOLEST SERPL-MCNC: 229 MG/DL (ref 100–199)
CO2 SERPL-SCNC: 21 MMOL/L (ref 20–29)
CREAT SERPL-MCNC: 1.07 MG/DL (ref 0.76–1.27)
EGFR: 89 ML/MIN/1.73
ERYTHROCYTE [DISTWIDTH] IN BLOOD BY AUTOMATED COUNT: 17.6 % (ref 11.6–15.4)
GLOBULIN SER CALC-MCNC: 3.2 G/DL (ref 1.5–4.5)
GLUCOSE SERPL-MCNC: 80 MG/DL (ref 65–99)
HCT VFR BLD AUTO: 43.3 % (ref 37.5–51)
HDLC SERPL-MCNC: 74 MG/DL
HGB BLD-MCNC: 13.7 G/DL (ref 13–17.7)
IMP & REVIEW OF LAB RESULTS: NORMAL
LDLC SERPL CALC-MCNC: 144 MG/DL (ref 0–99)
LIPASE SERPL-CCNC: 21 U/L (ref 13–78)
MCH RBC QN AUTO: 22.2 PG (ref 26.6–33)
MCHC RBC AUTO-ENTMCNC: 31.6 G/DL (ref 31.5–35.7)
MCV RBC AUTO: 70 FL (ref 79–97)
PLATELET # BLD AUTO: 374 X10E3/UL (ref 150–450)
POTASSIUM SERPL-SCNC: 4.5 MMOL/L (ref 3.5–5.2)
PROT SERPL-MCNC: 8.1 G/DL (ref 6–8.5)
RBC # BLD AUTO: 6.18 X10E6/UL (ref 4.14–5.8)
SODIUM SERPL-SCNC: 140 MMOL/L (ref 134–144)
TRIGL SERPL-MCNC: 64 MG/DL (ref 0–149)
VLDLC SERPL CALC-MCNC: 11 MG/DL (ref 5–40)
WBC # BLD AUTO: 4.4 X10E3/UL (ref 3.4–10.8)

## 2022-12-07 ENCOUNTER — HOSPITAL ENCOUNTER (EMERGENCY)
Age: 42
Discharge: HOME | End: 2022-12-07
Attending: EMERGENCY MEDICINE
Payer: COMMERCIAL

## 2022-12-07 VITALS
OXYGEN SATURATION: 98 % | HEART RATE: 72 BPM | TEMPERATURE: 98.5 F | BODY MASS INDEX: 22.62 KG/M2 | WEIGHT: 132.5 LBS | RESPIRATION RATE: 18 BRPM | HEIGHT: 64 IN | SYSTOLIC BLOOD PRESSURE: 133 MMHG | DIASTOLIC BLOOD PRESSURE: 89 MMHG

## 2022-12-07 DIAGNOSIS — J45.21 MILD INTERMITTENT ASTHMA WITH ACUTE EXACERBATION: Primary | ICD-10-CM

## 2022-12-07 LAB
FLUAV AG NPH QL IA: NEGATIVE
FLUBV AG NOSE QL IA: NEGATIVE

## 2022-12-07 PROCEDURE — 99283 EMERGENCY DEPT VISIT LOW MDM: CPT

## 2022-12-07 PROCEDURE — 94640 AIRWAY INHALATION TREATMENT: CPT

## 2022-12-07 PROCEDURE — U0005 INFEC AGEN DETEC AMPLI PROBE: HCPCS

## 2022-12-07 PROCEDURE — 87804 INFLUENZA ASSAY W/OPTIC: CPT

## 2022-12-07 PROCEDURE — 74011000250 HC RX REV CODE- 250: Performed by: NURSE PRACTITIONER

## 2022-12-07 PROCEDURE — 77030029684 HC NEB SM VOL KT MONA -A

## 2022-12-07 PROCEDURE — 74011636637 HC RX REV CODE- 636/637: Performed by: NURSE PRACTITIONER

## 2022-12-07 RX ORDER — PREDNISONE 20 MG/1
60 TABLET ORAL ONCE
Status: COMPLETED | OUTPATIENT
Start: 2022-12-07 | End: 2022-12-07

## 2022-12-07 RX ORDER — ALBUTEROL SULFATE 90 UG/1
1 AEROSOL, METERED RESPIRATORY (INHALATION)
Qty: 18 G | Refills: 0 | Status: SHIPPED | OUTPATIENT
Start: 2022-12-07

## 2022-12-07 RX ORDER — IPRATROPIUM BROMIDE AND ALBUTEROL SULFATE 2.5; .5 MG/3ML; MG/3ML
3 SOLUTION RESPIRATORY (INHALATION)
Status: COMPLETED | OUTPATIENT
Start: 2022-12-07 | End: 2022-12-07

## 2022-12-07 RX ORDER — PREDNISONE 20 MG/1
60 TABLET ORAL DAILY
Qty: 21 TABLET | Refills: 0 | Status: SHIPPED | OUTPATIENT
Start: 2022-12-07 | End: 2022-12-14

## 2022-12-07 RX ORDER — ALBUTEROL SULFATE 0.83 MG/ML
5 SOLUTION RESPIRATORY (INHALATION)
Status: COMPLETED | OUTPATIENT
Start: 2022-12-07 | End: 2022-12-07

## 2022-12-07 RX ADMIN — IPRATROPIUM BROMIDE AND ALBUTEROL SULFATE 3 ML: 2.5; .5 SOLUTION RESPIRATORY (INHALATION) at 14:49

## 2022-12-07 RX ADMIN — ALBUTEROL SULFATE 5 MG: 2.5 SOLUTION RESPIRATORY (INHALATION) at 15:50

## 2022-12-07 RX ADMIN — PREDNISONE 60 MG: 20 TABLET ORAL at 15:00

## 2022-12-07 NOTE — DISCHARGE INSTRUCTIONS
Your flu test is negative. View my chart in 24 hours for your COVID results     It was a pleasure taking care of you at [facility] Emergency Department today. We know that when you come to [facility], you are entrusting us with your health, comfort, and safety. Our physicians and nurses honor that trust, and we truly appreciate the opportunity to care for you and your loved ones. We also value our feedback. If you receive a survey about your Emergency Department experience today, please fill it out. We care about our patients' feedback, and we listen to what you have to say. Thank you!

## 2022-12-07 NOTE — ED TRIAGE NOTES
Pt reports SOB, cough, wheezing. Pt reports using albuterol inhaler q30 mins without relief.  Also reports chest pain

## 2022-12-07 NOTE — ED NOTES
Discharge instructions were given to the patient by Nancy Ely RN. The patient left the Emergency Department ambulatory, alert and oriented and in no acute distress with 2 prescriptions. The patient was encouraged to call or return to the ED for worsening issues or problems and was encouraged to schedule a follow up appointment for continuing care. The patient verbalized understanding of discharge instructions and prescriptions, all questions were answered. The patient has no further concerns at this time.

## 2022-12-07 NOTE — ED NOTES
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
## 2022-12-07 NOTE — ED PROVIDER NOTES
EMERGENCY DEPARTMENT HISTORY AND PHYSICAL EXAM      Date: 12/7/2022  Patient Name: Zafar Farmer    History of Presenting Illness     Chief Complaint   Patient presents with    Shortness of Breath       History Provided By: Patient      Additional History (Context): Zafar Farmer is a 40 y.o. male with asthma who presents with shortness of breath. Onset 2 days ago. Reports having dry cough with wheezing. Denies fever ,chills, sore throat. He reports intermittent nasal congestion. Denies exposure to sick contacts. He reports using his inhaler every 30 minutes with no relief. He denies having frequent asthma attack. PCP: Annie Farrar., NP    Current Outpatient Medications   Medication Sig Dispense Refill    predniSONE (DELTASONE) 20 mg tablet Take 3 Tablets by mouth daily for 7 days. With Breakfast 21 Tablet 0    albuterol (PROVENTIL HFA, VENTOLIN HFA, PROAIR HFA) 90 mcg/actuation inhaler Take 1 Puff by inhalation every four (4) hours as needed for Wheezing. 18 g 0    albuterol (PROVENTIL HFA, VENTOLIN HFA, PROAIR HFA) 90 mcg/actuation inhaler Take 1 Puff by inhalation every six (6) hours as needed for Wheezing. 18 g 2    naproxen (NAPROSYN) 500 mg tablet Take 500 mg by mouth two (2) times daily (with meals). (Patient not taking: Reported on 12/7/2022)      tiZANidine (ZANAFLEX) 4 mg tablet Take 1 Tablet by mouth three (3) times daily as needed for Muscle Spasm(s).  (Patient not taking: Reported on 12/7/2022) 30 Tablet 0       Past History     Past Medical History:  Past Medical History:   Diagnosis Date    Asthma     Low back pain     Scoliosis 1989       Past Surgical History:  Past Surgical History:   Procedure Laterality Date    HX OTHER SURGICAL      sx to enlarge esophagus as infant    HX OTHER SURGICAL      hernia repair as infant       Family History:  Family History   Problem Relation Age of Onset    Hypertension Father     Diabetes Maternal Grandmother     Cancer Paternal Grandmother Social History:  Social History     Tobacco Use    Smoking status: Every Day     Packs/day: 0.50     Types: Cigarettes    Smokeless tobacco: Never    Tobacco comments:     10 daily   Substance Use Topics    Alcohol use: Yes     Comment: rarely    Drug use: Yes     Types: Marijuana       Allergies: Allergies   Allergen Reactions    Aspirin Hives     Has tolerated ibuprofen in the past    Aspirnil Hives     Has tolerated ibuprofen in the past    Iodine Hives    Shellfish Derived Hives         Review of Systems   Review of Systems   Constitutional:  Negative for chills and fever. HENT:  Negative for congestion, ear discharge, ear pain, rhinorrhea and sore throat. Respiratory:  Positive for cough, shortness of breath and wheezing. Negative for chest tightness. Cardiovascular:  Negative for chest pain and leg swelling. Gastrointestinal:  Negative for abdominal pain, constipation, diarrhea, nausea and vomiting. Genitourinary:  Negative for dysuria, frequency and urgency. Musculoskeletal:  Negative for arthralgias, back pain, gait problem and neck pain. Skin:  Negative for wound. Neurological:  Negative for dizziness, numbness and headaches. All other systems reviewed and are negative. Physical Exam     Vitals:    12/07/22 1311   BP: 133/89   Pulse: 72   Resp: 18   Temp: 98.5 °F (36.9 °C)   SpO2: 98%   Weight: 60.1 kg (132 lb 7.9 oz)   Height: 5' 4\" (1.626 m)     Physical Exam  Vitals and nursing note reviewed. Constitutional:       General: He is not in acute distress. Appearance: He is well-developed. He is not ill-appearing. HENT:      Head: Normocephalic and atraumatic. Right Ear: Tympanic membrane and ear canal normal.      Left Ear: Tympanic membrane and ear canal normal.      Nose: Nose normal.      Mouth/Throat:      Mouth: Mucous membranes are moist.      Pharynx: Oropharynx is clear. No oropharyngeal exudate or posterior oropharyngeal erythema.    Eyes:      Extraocular Movements: Extraocular movements intact. Conjunctiva/sclera: Conjunctivae normal.      Pupils: Pupils are equal, round, and reactive to light. Cardiovascular:      Rate and Rhythm: Normal rate and regular rhythm. Pulses: Normal pulses. Heart sounds: Normal heart sounds. Pulmonary:      Effort: Pulmonary effort is normal.      Breath sounds: Wheezing present. Abdominal:      General: Bowel sounds are normal. There is no distension. Palpations: Abdomen is soft. There is no mass. Tenderness: There is no abdominal tenderness. There is no right CVA tenderness, left CVA tenderness or guarding. Musculoskeletal:      Cervical back: Normal range of motion and neck supple. Lymphadenopathy:      Cervical: No cervical adenopathy. Skin:     General: Skin is warm and dry. Neurological:      Mental Status: He is alert and oriented to person, place, and time. GCS: GCS eye subscore is 4. GCS verbal subscore is 5. GCS motor subscore is 6. Cranial Nerves: No cranial nerve deficit. Psychiatric:         Thought Content: Thought content normal.         Diagnostic Study Results     Labs -     Recent Results (from the past 12 hour(s))   INFLUENZA A+B VIRAL AGS    Collection Time: 12/07/22  3:03 PM   Result Value Ref Range    Influenza A Antigen Negative NEG      Influenza B Antigen Negative NEG         Radiologic Studies -   No orders to display     CT Results  (Last 48 hours)      None          CXR Results  (Last 48 hours)      None              Medical Decision Making   I am the first provider for this patient. I reviewed the vital signs, available nursing notes, past medical history, past surgical history, family history and social history. Vital Signs-Reviewed the patient's vital signs. Records Reviewed: Nursing Notes and Old Medical Records    39 yo M presents with shortness of breath exhibiting wheezing on exam. Vitals are within normal limits.  Low suspicion for covid or influenza but will r/o due to c/o nasal congestion as well. Duoneb ordered due to no relief after multiple inhaler uses prior to arrival.     DDX: COVID 19, URI, Bronchitis, Influenza , asthma exacerbation  ED Course:   ED Course as of 12/07/22 1627   Wed Dec 07, 2022   1520 Progress Note:   Mild wheezing still present and pt is requesting for another treatment prior to discharge. Still waiting for influenza results at this time. [NA]      ED Course User Index  [NA] Ga Montiel NP         Disposition:  Discharge     DISCHARGE NOTE:   Pt has been reexamined. Patient has no new complaints, changes, or physical findings. Care plan outlined and precautions discussed. All of pt's questions and concerns were addressed. Patient was instructed and agrees to follow up with PCP, as well as to return to the ED upon further deterioration. Patient is ready to go home. Follow-up Information       Follow up With Specialties Details Why Contact Info    Annie Farrar., NP Nurse Practitioner Call in 1 week If symptoms worsen, As needed [facility]  [address] [facility]  [phone]              Current Discharge Medication List        START taking these medications    Details   predniSONE (DELTASONE) 20 mg tablet Take 3 Tablets by mouth daily for 7 days. With Breakfast  Qty: 21 Tablet, Refills: 0  Start date: 12/7/2022, End date: 12/14/2022      !! albuterol (PROVENTIL HFA, VENTOLIN HFA, PROAIR HFA) 90 mcg/actuation inhaler Take 1 Puff by inhalation every four (4) hours as needed for Wheezing. Qty: 18 g, Refills: 0  Start date: 12/7/2022       !! - Potential duplicate medications found. Please discuss with provider. CONTINUE these medications which have NOT CHANGED    Details   !! albuterol (PROVENTIL HFA, VENTOLIN HFA, PROAIR HFA) 90 mcg/actuation inhaler Take 1 Puff by inhalation every six (6) hours as needed for Wheezing.   Qty: 18 g, Refills: 2    Associated Diagnoses: Mild intermittent asthma without complication       !! - Potential duplicate medications found. Please discuss with provider. Diagnosis     Clinical Impression:   1.  Mild intermittent asthma with acute exacerbation

## 2022-12-08 LAB
SARS-COV-2, XPLCVT: NOT DETECTED
SOURCE, COVRS: NORMAL

## 2023-01-27 ENCOUNTER — VIRTUAL VISIT (OUTPATIENT)
Dept: INTERNAL MEDICINE CLINIC | Age: 43
End: 2023-01-27
Payer: COMMERCIAL

## 2023-01-27 DIAGNOSIS — J45.41 MODERATE PERSISTENT ASTHMA WITH EXACERBATION: Primary | ICD-10-CM

## 2023-01-27 DIAGNOSIS — J45.20 MILD INTERMITTENT ASTHMA WITHOUT COMPLICATION: ICD-10-CM

## 2023-01-27 RX ORDER — ALBUTEROL SULFATE 90 UG/1
1 AEROSOL, METERED RESPIRATORY (INHALATION)
Qty: 18 G | Refills: 2 | Status: SHIPPED | OUTPATIENT
Start: 2023-01-27

## 2023-01-27 RX ORDER — PREDNISONE 10 MG/1
10 TABLET ORAL SEE ADMIN INSTRUCTIONS
Qty: 21 TABLET | Refills: 0 | Status: SHIPPED | OUTPATIENT
Start: 2023-01-27

## 2023-01-27 RX ORDER — PROMETHAZINE HYDROCHLORIDE AND DEXTROMETHORPHAN HYDROBROMIDE 6.25; 15 MG/5ML; MG/5ML
5 SYRUP ORAL
Qty: 118 ML | Refills: 0 | Status: SHIPPED | OUTPATIENT
Start: 2023-01-27 | End: 2023-02-03

## 2023-01-27 RX ORDER — FLUTICASONE FUROATE 100 UG/1
1 POWDER RESPIRATORY (INHALATION) DAILY
Qty: 30 EACH | Refills: 0 | Status: SHIPPED | OUTPATIENT
Start: 2023-01-27

## 2023-01-27 NOTE — PROGRESS NOTES
Imelda Casas is a 40 y.o. male who was seen by synchronous (real-time) audio-video technology on 1/27/2023 for No chief complaint on file. Assessment & Plan:   Diagnoses and all orders for this visit:    1. Moderate persistent asthma with exacerbation  -     predniSONE (STERAPRED DS) 10 mg dose pack; Take 1 Tablet by mouth See Admin Instructions. See administration instruction per 10mg dose pack  -     fluticasone furoate (Arnuity Ellipta) 100 mcg/actuation dsdv inhaler; Take 1 Puff by inhalation daily. -     promethazine-dextromethorphan (PROMETHAZINE-DM) 6.25-15 mg/5 mL syrup; Take 5 mL by mouth every four (4) hours as needed for Cough for up to 7 days. -     albuterol (PROVENTIL HFA, VENTOLIN HFA, PROAIR HFA) 90 mcg/actuation inhaler; Take 1 Puff by inhalation every six (6) hours as needed for Wheezing. 2. Mild intermittent asthma without complication  -     fluticasone furoate (Arnuity Ellipta) 100 mcg/actuation dsdv inhaler; Take 1 Puff by inhalation daily. -     promethazine-dextromethorphan (PROMETHAZINE-DM) 6.25-15 mg/5 mL syrup; Take 5 mL by mouth every four (4) hours as needed for Cough for up to 7 days. -     albuterol (PROVENTIL HFA, VENTOLIN HFA, PROAIR HFA) 90 mcg/actuation inhaler; Take 1 Puff by inhalation every six (6) hours as needed for Wheezing. The complexity of medical decision making for this visit is moderate     S/s x 1 month not worsening, no fevers, less likely pneumonia, rather asthma exacerbation/bronchitis  Call Monday [facility]    Subjective:       Prior to Admission medications    Medication Sig Start Date End Date Taking? Authorizing Provider   albuterol (PROVENTIL HFA, VENTOLIN HFA, PROAIR HFA) 90 mcg/actuation inhaler Take 1 Puff by inhalation every four (4) hours as needed for Wheezing. 12/7/22   Ga Montiel NP   naproxen (NAPROSYN) 500 mg tablet Take 500 mg by mouth two (2) times daily (with meals).   Patient not taking: Reported on 12/7/2022    Provider, Historical   albuterol (PROVENTIL HFA, VENTOLIN HFA, PROAIR HFA) 90 mcg/actuation inhaler Take 1 Puff by inhalation every six (6) hours as needed for Wheezing. 4/28/22   Amelia Espinosa NP   tiZANidine (ZANAFLEX) 4 mg tablet Take 1 Tablet by mouth three (3) times daily as needed for Muscle Spasm(s). Patient not taking: Reported on 12/7/2022 4/28/22   Amelia Espinosa NP         ROS    Asthma: x1.5 mo -increased wheezing and albuterol use, no sick contacts, no fevers, cough w/ yellow mucous  Some allergy s/s  taking claritin  No body aches  Home covid test negative        Objective:   No flowsheet data found.      [INSTRUCTIONS:  \"[x]\" Indicates a positive item  \"[]\" Indicates a negative item  -- DELETE ALL ITEMS NOT EXAMINED]    Constitutional: [x] Appears well-developed and well-nourished [x] No apparent distress      [] Abnormal -     Mental status: [x] Alert and awake  [x] Oriented to person/place/time [x] Able to follow commands    [] Abnormal -     Eyes:   EOM    [x]  Normal    [] Abnormal -   Sclera  [x]  Normal    [] Abnormal -          Discharge [x]  None visible   [] Abnormal -     HENT: [x] Normocephalic, atraumatic  [] Abnormal -   [x] Mouth/Throat: Mucous membranes are moist    External Ears [x] Normal  [] Abnormal -    Neck: [x] No visualized mass [] Abnormal -     Pulmonary/Chest: [x] Respiratory effort normal   [x] No visualized signs of difficulty breathing or respiratory distress        [] Abnormal -      Musculoskeletal:   [x] Normal gait with no signs of ataxia         [x] Normal range of motion of neck        [] Abnormal -     Neurological:        [x] No Facial Asymmetry (Cranial nerve 7 motor function) (limited exam due to video visit)          [x] No gaze palsy        [] Abnormal -          Skin:        [x] No significant exanthematous lesions or discoloration noted on facial skin         [] Abnormal -            Psychiatric:       [x] Normal Affect [] Abnormal -        [x] No Hallucinations    Other pertinent observable physical exam findings:-        We discussed the expected course, resolution and complications of the diagnosis(es) in detail. Medication risks, benefits, costs, interactions, and alternatives were discussed as indicated. I advised him to contact the office if his condition worsens, changes or fails to improve as anticipated. He expressed understanding with the diagnosis(es) and plan. Stephanie Coyne, was evaluated through a synchronous (real-time) audio-video encounter. The patient (or guardian if applicable) is aware that this is a billable service, which includes applicable co-pays. This Virtual Visit was conducted with patient's (and/or legal guardian's) consent. The visit was conducted pursuant to the emergency declaration under the [address] [place] [address], [address] [place] [address] authority and the [employer] and [employer] General Act. Patient identification was verified, and a caregiver was present when appropriate. The patient was located at: Home: [address] [place] [address]  The provider was located at: Home: [address] [place] [address].  Renny Robert NP

## 2023-01-31 ENCOUNTER — PATIENT MESSAGE (OUTPATIENT)
Dept: INTERNAL MEDICINE CLINIC | Age: 43
End: 2023-01-31

## 2023-02-23 DIAGNOSIS — J45.20 MILD INTERMITTENT ASTHMA WITHOUT COMPLICATION: ICD-10-CM

## 2023-02-23 DIAGNOSIS — J45.41 MODERATE PERSISTENT ASTHMA WITH EXACERBATION: ICD-10-CM

## 2023-02-23 RX ORDER — FLUTICASONE FUROATE 100 UG/1
POWDER RESPIRATORY (INHALATION)
Qty: 30 EACH | Refills: 0 | Status: SHIPPED | OUTPATIENT
Start: 2023-02-23

## 2024-02-01 ENCOUNTER — HOSPITAL ENCOUNTER (EMERGENCY)
Facility: HOSPITAL | Age: 44
Discharge: HOME | End: 2024-02-01
Attending: EMERGENCY MEDICINE
Payer: COMMERCIAL

## 2024-02-01 VITALS
OXYGEN SATURATION: 100 % | RESPIRATION RATE: 18 BRPM | HEIGHT: 65 IN | DIASTOLIC BLOOD PRESSURE: 78 MMHG | SYSTOLIC BLOOD PRESSURE: 136 MMHG | HEART RATE: 71 BPM | WEIGHT: 135 LBS | BODY MASS INDEX: 22.49 KG/M2 | TEMPERATURE: 97.9 F

## 2024-02-01 DIAGNOSIS — R09.81 NASAL CONGESTION: ICD-10-CM

## 2024-02-01 DIAGNOSIS — Z20.822 PERSON UNDER INVESTIGATION FOR COVID-19: Primary | ICD-10-CM

## 2024-02-01 DIAGNOSIS — Z20.822 CLOSE EXPOSURE TO COVID-19 VIRUS: ICD-10-CM

## 2024-02-01 LAB
FLUAV RNA SPEC QL NAA+PROBE: NOT DETECTED
FLUBV RNA SPEC QL NAA+PROBE: NOT DETECTED
SARS-COV-2 RNA RESP QL NAA+PROBE: NOT DETECTED

## 2024-02-01 PROCEDURE — 87636 SARSCOV2 & INF A&B AMP PRB: CPT

## 2024-02-01 PROCEDURE — 99283 EMERGENCY DEPT VISIT LOW MDM: CPT

## 2024-02-01 ASSESSMENT — PAIN - FUNCTIONAL ASSESSMENT: PAIN_FUNCTIONAL_ASSESSMENT: NONE - DENIES PAIN

## 2024-02-01 NOTE — ED TRIAGE NOTES
Pt reports to ER w/ concern for covid after exposure. Pt notes he started to have cough and congestion x tonight. Used inhaler PTA due to feeling SOB but pt notes that feeling has resolved.    Alert and oriented x4. Skin warm dry and intact. Ambulates independently.      Emergency Department Nursing Plan of Care       The Nursing Plan of Care is developed from the Nursing assessment and Emergency Department Attending provider initial evaluation.  The plan of care may be reviewed in the “ED Provider note”.    The Plan of Care was developed with the following considerations:   Patient / Family readiness to learn indicated by:verbalized understanding  Persons(s) to be included in education: patient  Barriers to Learning/Limitations: No    Signed     Alexis Reyes, RN    2/1/2024   2:49 AM

## 2024-02-01 NOTE — ED PROVIDER NOTES
[facility] EMERGENCY DEPT  EMERGENCY DEPARTMENT ENCOUNTER       Pt Name: Zak Manrique  MRN: [id number]  Birthdate 1980  Date of evaluation: 2/1/2024  Provider: Dustin Steward MD   PCP: Carolyn Burgess APRN - NP  Note Started: 3:55 AM 2/1/24     CHIEF COMPLAINT       Chief Complaint   Patient presents with    Concern For COVID-19        HISTORY OF PRESENT ILLNESS: 1 or more elements      History From: Patient, History limited by: No limitations     Zak Manrique is a 43 y.o. male who presents with chief complaint of concern for COVID.  Patient endorses nasal congestion.  Symptoms are new which started tonight.  He endorses exposure to a friend who just tested positive for COVID and patient would like to be tested for COVID as he needs to tell his job.  Patient denies fevers, chills, cough, shortness of breath, chest pain, sore throat.     Nursing Notes were all reviewed and agreed with or any disagreements were addressed in the HPI.     REVIEW OF SYSTEMS        Positives and Pertinent negatives as per HPI.    PAST HISTORY     Past Medical History:  Past Medical History:   Diagnosis Date    Asthma     Low back pain     Scoliosis 1989       Past Surgical History:  Past Surgical History:   Procedure Laterality Date    OTHER SURGICAL HISTORY      sx to enlarge esophagus as infant    OTHER SURGICAL HISTORY      hernia repair as infant       Family History:  Family History   Problem Relation Age of Onset    Cancer Paternal Grandmother     Diabetes Maternal Grandmother     Hypertension Father        Social History:  Social History     Tobacco Use    Smoking status: Every Day     Current packs/day: 0.50     Types: Cigarettes    Smokeless tobacco: Never    Tobacco comments:     Quit smoking: 10 daily   Substance Use Topics    Alcohol use: Yes    Drug use: Yes     Types: Marijuana (Weed)       Allergies:  Allergies   Allergen Reactions    Aspirin Hives     Has tolerated ibuprofen in the past    Iodine Hives

## 2024-02-01 NOTE — DISCHARGE INSTRUCTIONS
You COVID and influenza tests were negative today but there is still high suspicion for COVID since your symptoms have only been present for one day.

## 2024-02-01 NOTE — ED NOTES

## 2024-07-17 ENCOUNTER — TELEPHONE (OUTPATIENT)
Age: 44
End: 2024-07-17

## 2024-09-23 RX ORDER — ALBUTEROL SULFATE 90 UG/1
INHALANT RESPIRATORY (INHALATION)
Qty: 6.7 EACH | Refills: 2 | Status: SHIPPED | OUTPATIENT
Start: 2024-09-23

## 2024-10-29 ENCOUNTER — OFFICE VISIT (OUTPATIENT)
Facility: CLINIC | Age: 44
End: 2024-10-29

## 2024-10-29 VITALS
BODY MASS INDEX: 23.69 KG/M2 | RESPIRATION RATE: 18 BRPM | SYSTOLIC BLOOD PRESSURE: 139 MMHG | HEIGHT: 65 IN | HEART RATE: 67 BPM | OXYGEN SATURATION: 100 % | WEIGHT: 142.2 LBS | TEMPERATURE: 97.9 F | DIASTOLIC BLOOD PRESSURE: 85 MMHG

## 2024-10-29 DIAGNOSIS — K64.9 HEMORRHOIDS, UNSPECIFIED HEMORRHOID TYPE: ICD-10-CM

## 2024-10-29 DIAGNOSIS — Z00.00 ENCOUNTER FOR WELL ADULT EXAM WITHOUT ABNORMAL FINDINGS: ICD-10-CM

## 2024-10-29 DIAGNOSIS — F17.200 SMOKER: ICD-10-CM

## 2024-10-29 DIAGNOSIS — Z00.00 ENCOUNTER FOR WELL ADULT EXAM WITHOUT ABNORMAL FINDINGS: Primary | ICD-10-CM

## 2024-10-29 LAB
ALBUMIN SERPL-MCNC: 4.4 G/DL (ref 3.5–5)
ALBUMIN/GLOB SERPL: 1.3 (ref 1.1–2.2)
ALP SERPL-CCNC: 67 U/L (ref 45–117)
ALT SERPL-CCNC: 19 U/L (ref 12–78)
ANION GAP SERPL CALC-SCNC: 5 MMOL/L (ref 2–12)
AST SERPL-CCNC: 12 U/L (ref 15–37)
BILIRUB SERPL-MCNC: 0.4 MG/DL (ref 0.2–1)
BUN SERPL-MCNC: 11 MG/DL (ref 6–20)
BUN/CREAT SERPL: 10 (ref 12–20)
CALCIUM SERPL-MCNC: 9.4 MG/DL (ref 8.5–10.1)
CHLORIDE SERPL-SCNC: 106 MMOL/L (ref 97–108)
CHOLEST SERPL-MCNC: 230 MG/DL
CO2 SERPL-SCNC: 28 MMOL/L (ref 21–32)
CREAT SERPL-MCNC: 1.13 MG/DL (ref 0.7–1.3)
ERYTHROCYTE [DISTWIDTH] IN BLOOD BY AUTOMATED COUNT: 17.5 % (ref 11.5–14.5)
GLOBULIN SER CALC-MCNC: 3.4 G/DL (ref 2–4)
GLUCOSE SERPL-MCNC: 99 MG/DL (ref 65–100)
HCT VFR BLD AUTO: 40.1 % (ref 36.6–50.3)
HDLC SERPL-MCNC: 72 MG/DL
HDLC SERPL: 3.2 (ref 0–5)
HGB BLD-MCNC: 12.5 G/DL (ref 12.1–17)
LDLC SERPL CALC-MCNC: 126.2 MG/DL (ref 0–100)
MCH RBC QN AUTO: 21.9 PG (ref 26–34)
MCHC RBC AUTO-ENTMCNC: 31.2 G/DL (ref 30–36.5)
MCV RBC AUTO: 70.1 FL (ref 80–99)
NRBC # BLD: 0 K/UL (ref 0–0.01)
NRBC BLD-RTO: 0 PER 100 WBC
PLATELET # BLD AUTO: 345 K/UL (ref 150–400)
PMV BLD AUTO: 9.4 FL (ref 8.9–12.9)
POTASSIUM SERPL-SCNC: 4.2 MMOL/L (ref 3.5–5.1)
PROT SERPL-MCNC: 7.8 G/DL (ref 6.4–8.2)
RBC # BLD AUTO: 5.72 M/UL (ref 4.1–5.7)
SODIUM SERPL-SCNC: 139 MMOL/L (ref 136–145)
TRIGL SERPL-MCNC: 159 MG/DL
VLDLC SERPL CALC-MCNC: 31.8 MG/DL
WBC # BLD AUTO: 6 K/UL (ref 4.1–11.1)

## 2024-10-29 RX ORDER — HYDROCORTISONE ACETATE PRAMOXINE HCL 1; 1 G/100G; G/100G
CREAM TOPICAL
Qty: 30 G | Refills: 0 | Status: SHIPPED | OUTPATIENT
Start: 2024-10-29

## 2024-10-29 SDOH — ECONOMIC STABILITY: FOOD INSECURITY: WITHIN THE PAST 12 MONTHS, YOU WORRIED THAT YOUR FOOD WOULD RUN OUT BEFORE YOU GOT MONEY TO BUY MORE.: NEVER TRUE

## 2024-10-29 SDOH — ECONOMIC STABILITY: FOOD INSECURITY: WITHIN THE PAST 12 MONTHS, THE FOOD YOU BOUGHT JUST DIDN'T LAST AND YOU DIDN'T HAVE MONEY TO GET MORE.: NEVER TRUE

## 2024-10-29 SDOH — ECONOMIC STABILITY: INCOME INSECURITY: HOW HARD IS IT FOR YOU TO PAY FOR THE VERY BASICS LIKE FOOD, HOUSING, MEDICAL CARE, AND HEATING?: NOT HARD AT ALL

## 2024-10-29 ASSESSMENT — PATIENT HEALTH QUESTIONNAIRE - PHQ9
SUM OF ALL RESPONSES TO PHQ QUESTIONS 1-9: 0
1. LITTLE INTEREST OR PLEASURE IN DOING THINGS: NOT AT ALL
2. FEELING DOWN, DEPRESSED OR HOPELESS: NOT AT ALL
SUM OF ALL RESPONSES TO PHQ9 QUESTIONS 1 & 2: 0
SUM OF ALL RESPONSES TO PHQ QUESTIONS 1-9: 0

## 2024-10-29 NOTE — PROGRESS NOTES
\"Have you been to the ER, urgent care clinic since your last visit?  Hospitalized since your last visit?\"    NO    “Have you seen or consulted any other health care providers outside our system since your last visit?”    NO           Chief Complaint   Patient presents with    Annual Exam     Routine     /85 (Site: Left Upper Arm, Position: Sitting, Cuff Size: Medium Adult)   Pulse 67   Temp 97.9 °F (36.6 °C) (Oral)   Resp 18   Ht 1.651 m (5' 5\")   Wt 64.5 kg (142 lb 3.2 oz)   SpO2 100%   BMI 23.66 kg/m²

## 2024-10-29 NOTE — PROGRESS NOTES
Well Adult Note  Name: Zak Manrique Today’s Date: 10/29/2024   MRN: [id number] Sex: Male   Age: 44 y.o. Ethnicity:  /    : 1980 Race: Black /       Zak Manrique is here for a well adult exam.       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 Authorizing Provider   hydrocortisone ace-pramoxine (ANALPRAM-HC) 1-1 % cream Apply three times daily as needed Yes Carolyn Burgess APRN - NP   albuterol sulfate HFA (PROVENTIL;VENTOLIN;PROAIR) 108 (90 Base) MCG/ACT inhaler TAKE 1 PUFF BY INHALATION EVERY SIX HOURS AS NEEDED FOR WHEEZING. Yes Carolyn Burgess APRN - NP   fluticasone (ARNUITY ELLIPTA) 100 MCG/ACT AEPB INHALE 1 PUFF EVERY DAY Yes Automatic Reconciliation, Ar     Past Medical History:   Diagnosis Date    Asthma     Low back pain     Scoliosis      Past Surgical History:   Procedure Laterality Date    OTHER SURGICAL HISTORY      sx to enlarge esophagus as infant    OTHER SURGICAL HISTORY      hernia repair as infant     Family History   Problem Relation Age of Onset    Cancer Paternal Grandmother     Diabetes Maternal Grandmother     Hypertension Father      Social History     Tobacco Use    Smoking status: Every Day     Current packs/day: 0.50     Types: Cigarettes    Smokeless tobacco: Never    Tobacco

## 2025-04-03 ENCOUNTER — HOSPITAL ENCOUNTER (EMERGENCY)
Facility: HOSPITAL | Age: 45
Discharge: HOME | End: 2025-04-03
Attending: EMERGENCY MEDICINE
Payer: COMMERCIAL

## 2025-04-03 ENCOUNTER — APPOINTMENT (OUTPATIENT)
Facility: HOSPITAL | Age: 45
End: 2025-04-03
Payer: COMMERCIAL

## 2025-04-03 VITALS
OXYGEN SATURATION: 99 % | SYSTOLIC BLOOD PRESSURE: 139 MMHG | BODY MASS INDEX: 24.29 KG/M2 | DIASTOLIC BLOOD PRESSURE: 93 MMHG | TEMPERATURE: 97.9 F | HEART RATE: 80 BPM | WEIGHT: 145.94 LBS | RESPIRATION RATE: 18 BRPM

## 2025-04-03 DIAGNOSIS — J45.901 EXACERBATION OF ASTHMA, UNSPECIFIED ASTHMA SEVERITY, UNSPECIFIED WHETHER PERSISTENT: Primary | ICD-10-CM

## 2025-04-03 LAB
ALBUMIN SERPL-MCNC: 4.1 G/DL (ref 3.5–5)
ALBUMIN/GLOB SERPL: 0.9 (ref 1.1–2.2)
ALP SERPL-CCNC: 70 U/L (ref 45–117)
ALT SERPL-CCNC: 42 U/L (ref 12–78)
ANION GAP SERPL CALC-SCNC: 3 MMOL/L (ref 2–12)
AST SERPL-CCNC: 56 U/L (ref 15–37)
BASOPHILS # BLD: 0 K/UL (ref 0–0.1)
BASOPHILS NFR BLD: 0 % (ref 0–1)
BILIRUB SERPL-MCNC: 0.6 MG/DL (ref 0.2–1)
BUN SERPL-MCNC: 9 MG/DL (ref 6–20)
BUN/CREAT SERPL: 8 (ref 12–20)
CALCIUM SERPL-MCNC: 9.4 MG/DL (ref 8.5–10.1)
CHLORIDE SERPL-SCNC: 105 MMOL/L (ref 97–108)
CO2 SERPL-SCNC: 27 MMOL/L (ref 21–32)
CREAT SERPL-MCNC: 1.12 MG/DL (ref 0.7–1.3)
DIFFERENTIAL METHOD BLD: ABNORMAL
EOSINOPHIL # BLD: 0.13 K/UL (ref 0–0.4)
EOSINOPHIL NFR BLD: 4 % (ref 0–7)
ERYTHROCYTE [DISTWIDTH] IN BLOOD BY AUTOMATED COUNT: 16.4 % (ref 11.5–14.5)
FLUAV RNA SPEC QL NAA+PROBE: NOT DETECTED
FLUBV RNA SPEC QL NAA+PROBE: NOT DETECTED
GLOBULIN SER CALC-MCNC: 4.5 G/DL (ref 2–4)
GLUCOSE SERPL-MCNC: 87 MG/DL (ref 65–100)
HCT VFR BLD AUTO: 41.3 % (ref 36.6–50.3)
HGB BLD-MCNC: 13 G/DL (ref 12.1–17)
IMM GRANULOCYTES # BLD AUTO: 0 K/UL
IMM GRANULOCYTES NFR BLD AUTO: 0 %
LYMPHOCYTES # BLD: 1.63 K/UL (ref 0.8–3.5)
LYMPHOCYTES NFR BLD: 51 % (ref 12–49)
MCH RBC QN AUTO: 21.7 PG (ref 26–34)
MCHC RBC AUTO-ENTMCNC: 31.5 G/DL (ref 30–36.5)
MCV RBC AUTO: 68.8 FL (ref 80–99)
MONOCYTES # BLD: 0.38 K/UL (ref 0–1)
MONOCYTES NFR BLD: 12 % (ref 5–13)
NEUTS SEG # BLD: 1.06 K/UL (ref 1.8–8)
NEUTS SEG NFR BLD: 33 % (ref 32–75)
NRBC # BLD: 0 K/UL (ref 0–0.01)
NRBC BLD-RTO: 0 PER 100 WBC
PLATELET # BLD AUTO: 349 K/UL (ref 150–400)
PMV BLD AUTO: 8.8 FL (ref 8.9–12.9)
POTASSIUM SERPL-SCNC: 3.6 MMOL/L (ref 3.5–5.1)
PROT SERPL-MCNC: 8.6 G/DL (ref 6.4–8.2)
RBC # BLD AUTO: 6 M/UL (ref 4.1–5.7)
RBC MORPH BLD: ABNORMAL
SARS-COV-2 RNA RESP QL NAA+PROBE: NOT DETECTED
SODIUM SERPL-SCNC: 135 MMOL/L (ref 136–145)
SOURCE: NORMAL
TROPONIN I SERPL HS-MCNC: 7 NG/L (ref 0–76)
WBC # BLD AUTO: 3.2 K/UL (ref 4.1–11.1)

## 2025-04-03 PROCEDURE — 85025 COMPLETE CBC W/AUTO DIFF WBC: CPT

## 2025-04-03 PROCEDURE — 71046 X-RAY EXAM CHEST 2 VIEWS: CPT

## 2025-04-03 PROCEDURE — 93005 ELECTROCARDIOGRAM TRACING: CPT

## 2025-04-03 PROCEDURE — 87636 SARSCOV2 & INF A&B AMP PRB: CPT

## 2025-04-03 PROCEDURE — 6360000002 HC RX W HCPCS

## 2025-04-03 PROCEDURE — 6370000000 HC RX 637 (ALT 250 FOR IP)

## 2025-04-03 PROCEDURE — 99285 EMERGENCY DEPT VISIT HI MDM: CPT

## 2025-04-03 PROCEDURE — 84484 ASSAY OF TROPONIN QUANT: CPT

## 2025-04-03 PROCEDURE — 80053 COMPREHEN METABOLIC PANEL: CPT

## 2025-04-03 RX ORDER — PREDNISONE 50 MG/1
50 TABLET ORAL DAILY
Qty: 5 TABLET | Refills: 0 | Status: SHIPPED | OUTPATIENT
Start: 2025-04-03 | End: 2025-04-08

## 2025-04-03 RX ORDER — DEXAMETHASONE 4 MG/1
10 TABLET ORAL
Status: COMPLETED | OUTPATIENT
Start: 2025-04-03 | End: 2025-04-03

## 2025-04-03 RX ORDER — IPRATROPIUM BROMIDE AND ALBUTEROL SULFATE 2.5; .5 MG/3ML; MG/3ML
1 SOLUTION RESPIRATORY (INHALATION)
Status: COMPLETED | OUTPATIENT
Start: 2025-04-03 | End: 2025-04-03

## 2025-04-03 RX ORDER — ALBUTEROL SULFATE 90 UG/1
2 INHALANT RESPIRATORY (INHALATION) 4 TIMES DAILY PRN
Qty: 25.5 G | Refills: 0 | Status: SHIPPED | OUTPATIENT
Start: 2025-04-03

## 2025-04-03 RX ORDER — ALBUTEROL SULFATE 5 MG/ML
2.5 SOLUTION RESPIRATORY (INHALATION) EVERY 6 HOURS PRN
Qty: 120 EACH | Refills: 0 | Status: SHIPPED | OUTPATIENT
Start: 2025-04-03

## 2025-04-03 RX ORDER — GUAIFENESIN 600 MG/1
600 TABLET, EXTENDED RELEASE ORAL 2 TIMES DAILY
Qty: 30 TABLET | Refills: 0 | Status: SHIPPED | OUTPATIENT
Start: 2025-04-03 | End: 2025-04-18

## 2025-04-03 RX ADMIN — IPRATROPIUM BROMIDE AND ALBUTEROL SULFATE 1 DOSE: .5; 3 SOLUTION RESPIRATORY (INHALATION) at 20:09

## 2025-04-03 RX ADMIN — DEXAMETHASONE 10 MG: 4 TABLET ORAL at 20:09

## 2025-04-03 ASSESSMENT — PAIN - FUNCTIONAL ASSESSMENT: PAIN_FUNCTIONAL_ASSESSMENT: NONE - DENIES PAIN

## 2025-04-03 NOTE — ED TRIAGE NOTES
Reports cough, SOB, congestion, fatigue, loss of appetite for 3 days. Using inhaler without relief.

## 2025-04-03 NOTE — ED PROVIDER NOTES
[facility]  EMERGENCY DEPARTMENT ENCOUNTER      Pt Name: Zak Manrique  MRN: [id number]  Birthdate 1980  Date of evaluation: 4/3/2025  Provider: Christian Novoa PA-C    CHIEF COMPLAINT       Chief Complaint   Patient presents with    Asthma    Cough         HISTORY OF PRESENT ILLNESS    Patient is a 45-year-old male with history of asthma, scoliosis, and lower back pain who presents emergency room with reports of cough, shortness of breath, congestion, chest tightness, fatigue, and loss of appetite for 3 days.  Patient reports that the shortness of breath and chest tightness feels like an asthma exacerbation.  Patient reports he has been using his inhaler at home without any relief.  Patient reports no fever at home.  Patient reports he needs a new prescription for his inhaler as well.  Patient reports he has been around sick contacts.  Patient reports no recent travel by car by plane more than 5 hours, recent surgery, history of blood clots, leg pain, leg swelling, hemoptysis, or hormone use. Patient denies abdominal pain, urinary symptoms, nausea or vomiting, diarrhea or constipation, headache, dizziness, lightheadedness, fever or chills.  Patient reports alcohol use, everyday cigarette smoking, denies vaping and admits to marijuana use.          Nursing Notes were reviewed.    REVIEW OF SYSTEMS       Review of Systems      PAST MEDICAL HISTORY     Past Medical History:   Diagnosis Date    Asthma     Low back pain     Scoliosis 1989         SURGICAL HISTORY       Past Surgical History:   Procedure Laterality Date    OTHER SURGICAL HISTORY      sx to enlarge esophagus as infant    OTHER SURGICAL HISTORY      hernia repair as infant         CURRENT MEDICATIONS       Previous Medications    ALBUTEROL SULFATE HFA (PROVENTIL;VENTOLIN;PROAIR) 108 (90 BASE) MCG/ACT INHALER    TAKE 1 PUFF BY INHALATION EVERY SIX HOURS AS NEEDED FOR WHEEZING.    FLUTICASONE (ARNUITY ELLIPTA) 100 MCG/ACT AEPB

## 2025-04-04 LAB
EKG ATRIAL RATE: 81 BPM
EKG DIAGNOSIS: NORMAL
EKG P AXIS: 41 DEGREES
EKG P-R INTERVAL: 144 MS
EKG Q-T INTERVAL: 352 MS
EKG QRS DURATION: 84 MS
EKG QTC CALCULATION (BAZETT): 408 MS
EKG R AXIS: -14 DEGREES
EKG T AXIS: 44 DEGREES
EKG VENTRICULAR RATE: 81 BPM

## 2025-04-04 PROCEDURE — 93010 ELECTROCARDIOGRAM REPORT: CPT | Performed by: SPECIALIST

## 2025-04-04 NOTE — DISCHARGE INSTRUCTIONS
Discussed visit today.  You were seen in the emergency room today for chest tightness, wheezing, and cough.  Discussed all your labs are reassuring.  Please follow-up with your primary care provider for further evaluation and resolution of some of the abnormalities on your labs.  Your imaging was reassuring.  Please start taking the albuterol inhaler at home and take the prednisone for the full course.    Return to the emergency room with any worsening of symptoms.

## 2025-04-14 ENCOUNTER — TELEPHONE (OUTPATIENT)
Facility: CLINIC | Age: 45
End: 2025-04-14